# Patient Record
Sex: FEMALE | Race: BLACK OR AFRICAN AMERICAN | URBAN - METROPOLITAN AREA
[De-identification: names, ages, dates, MRNs, and addresses within clinical notes are randomized per-mention and may not be internally consistent; named-entity substitution may affect disease eponyms.]

---

## 2017-04-12 ENCOUNTER — TRANSFERRED RECORDS (OUTPATIENT)
Dept: HEALTH INFORMATION MANAGEMENT | Facility: CLINIC | Age: 47
End: 2017-04-12

## 2017-05-14 ENCOUNTER — HOSPITAL ENCOUNTER (EMERGENCY)
Facility: CLINIC | Age: 47
Discharge: PSYCHIATRIC OR SUBSTANCE USE FACILITY | End: 2017-05-15
Attending: EMERGENCY MEDICINE | Admitting: EMERGENCY MEDICINE

## 2017-05-14 DIAGNOSIS — F20.0 ACUTE EXACERBATION OF CHRONIC PARANOID SCHIZOPHRENIA (H): ICD-10-CM

## 2017-05-14 PROCEDURE — 99285 EMERGENCY DEPT VISIT HI MDM: CPT

## 2017-05-14 ASSESSMENT — ENCOUNTER SYMPTOMS
NERVOUS/ANXIOUS: 1
AGITATION: 1
HALLUCINATIONS: 0

## 2017-05-15 ENCOUNTER — HOSPITAL ENCOUNTER (INPATIENT)
Facility: CLINIC | Age: 47
LOS: 24 days | Discharge: HOME | DRG: 885 | End: 2017-06-08
Attending: PSYCHIATRY & NEUROLOGY | Admitting: PSYCHIATRY & NEUROLOGY

## 2017-05-15 VITALS
DIASTOLIC BLOOD PRESSURE: 77 MMHG | RESPIRATION RATE: 16 BRPM | SYSTOLIC BLOOD PRESSURE: 147 MMHG | TEMPERATURE: 99.3 F | OXYGEN SATURATION: 96 % | HEART RATE: 109 BPM | WEIGHT: 111.99 LBS

## 2017-05-15 DIAGNOSIS — F29 PSYCHOSIS, UNSPECIFIED PSYCHOSIS TYPE (H): Primary | ICD-10-CM

## 2017-05-15 LAB
ALBUMIN SERPL-MCNC: 3.5 G/DL (ref 3.4–5)
ALBUMIN UR-MCNC: NEGATIVE MG/DL
ALP SERPL-CCNC: 84 U/L (ref 40–150)
ALT SERPL W P-5'-P-CCNC: 27 U/L (ref 0–50)
AMORPH CRY #/AREA URNS HPF: ABNORMAL /HPF
AMPHETAMINES UR QL SCN: NORMAL
ANION GAP SERPL CALCULATED.3IONS-SCNC: 8 MMOL/L (ref 3–14)
APPEARANCE UR: ABNORMAL
AST SERPL W P-5'-P-CCNC: 14 U/L (ref 0–45)
BARBITURATES UR QL: NORMAL
BENZODIAZ UR QL: NORMAL
BILIRUB SERPL-MCNC: 0.3 MG/DL (ref 0.2–1.3)
BILIRUB UR QL STRIP: NEGATIVE
BUN SERPL-MCNC: 10 MG/DL (ref 7–30)
CALCIUM SERPL-MCNC: 8.7 MG/DL (ref 8.5–10.1)
CANNABINOIDS UR QL SCN: NORMAL
CHLORIDE SERPL-SCNC: 111 MMOL/L (ref 94–109)
CHOLEST SERPL-MCNC: 207 MG/DL
CO2 SERPL-SCNC: 25 MMOL/L (ref 20–32)
COCAINE UR QL: NORMAL
COLOR UR AUTO: YELLOW
CREAT SERPL-MCNC: 0.65 MG/DL (ref 0.52–1.04)
ERYTHROCYTE [DISTWIDTH] IN BLOOD BY AUTOMATED COUNT: 12.1 % (ref 10–15)
FSH SERPL-ACNC: 77.8 IU/L
GFR SERPL CREATININE-BSD FRML MDRD: ABNORMAL ML/MIN/1.7M2
GLUCOSE SERPL-MCNC: 100 MG/DL (ref 70–99)
GLUCOSE UR STRIP-MCNC: NEGATIVE MG/DL
HCT VFR BLD AUTO: 40.6 % (ref 35–47)
HDLC SERPL-MCNC: 71 MG/DL
HGB BLD-MCNC: 13.5 G/DL (ref 11.7–15.7)
HGB UR QL STRIP: NEGATIVE
KETONES UR STRIP-MCNC: NEGATIVE MG/DL
LDLC SERPL CALC-MCNC: 123 MG/DL
LEUKOCYTE ESTERASE UR QL STRIP: NEGATIVE
MCH RBC QN AUTO: 30.3 PG (ref 26.5–33)
MCHC RBC AUTO-ENTMCNC: 33.3 G/DL (ref 31.5–36.5)
MCV RBC AUTO: 91 FL (ref 78–100)
MUCOUS THREADS #/AREA URNS LPF: PRESENT /LPF
NITRATE UR QL: NEGATIVE
NONHDLC SERPL-MCNC: 136 MG/DL
OPIATES UR QL SCN: NORMAL
PCP UR QL SCN: NORMAL
PH UR STRIP: 7.5 PH (ref 5–7)
PLATELET # BLD AUTO: 196 10E9/L (ref 150–450)
POTASSIUM SERPL-SCNC: 3.9 MMOL/L (ref 3.4–5.3)
PROT SERPL-MCNC: 7.7 G/DL (ref 6.8–8.8)
RBC # BLD AUTO: 4.46 10E12/L (ref 3.8–5.2)
RBC #/AREA URNS AUTO: 0 /HPF (ref 0–2)
SODIUM SERPL-SCNC: 144 MMOL/L (ref 133–144)
SP GR UR STRIP: 1.01 (ref 1–1.03)
TRIGL SERPL-MCNC: 64 MG/DL
TSH SERPL DL<=0.005 MIU/L-ACNC: 1.09 MU/L (ref 0.4–4)
URN SPEC COLLECT METH UR: ABNORMAL
UROBILINOGEN UR STRIP-MCNC: NORMAL MG/DL (ref 0–2)
WBC # BLD AUTO: 8.5 10E9/L (ref 4–11)
WBC #/AREA URNS AUTO: 0 /HPF (ref 0–2)

## 2017-05-15 PROCEDURE — 84443 ASSAY THYROID STIM HORMONE: CPT | Performed by: STUDENT IN AN ORGANIZED HEALTH CARE EDUCATION/TRAINING PROGRAM

## 2017-05-15 PROCEDURE — 85027 COMPLETE CBC AUTOMATED: CPT | Performed by: STUDENT IN AN ORGANIZED HEALTH CARE EDUCATION/TRAINING PROGRAM

## 2017-05-15 PROCEDURE — 36415 COLL VENOUS BLD VENIPUNCTURE: CPT | Performed by: STUDENT IN AN ORGANIZED HEALTH CARE EDUCATION/TRAINING PROGRAM

## 2017-05-15 PROCEDURE — 80053 COMPREHEN METABOLIC PANEL: CPT | Performed by: STUDENT IN AN ORGANIZED HEALTH CARE EDUCATION/TRAINING PROGRAM

## 2017-05-15 PROCEDURE — 99223 1ST HOSP IP/OBS HIGH 75: CPT | Mod: GC | Performed by: PSYCHIATRY & NEUROLOGY

## 2017-05-15 PROCEDURE — 83001 ASSAY OF GONADOTROPIN (FSH): CPT | Performed by: PSYCHIATRY & NEUROLOGY

## 2017-05-15 PROCEDURE — 80342 ANTIPSYCHOTICS NOS 1-3: CPT | Performed by: PSYCHIATRY & NEUROLOGY

## 2017-05-15 PROCEDURE — 36415 COLL VENOUS BLD VENIPUNCTURE: CPT | Performed by: PSYCHIATRY & NEUROLOGY

## 2017-05-15 PROCEDURE — 80061 LIPID PANEL: CPT | Performed by: STUDENT IN AN ORGANIZED HEALTH CARE EDUCATION/TRAINING PROGRAM

## 2017-05-15 PROCEDURE — 12400001 ZZH R&B MH UMMC

## 2017-05-15 PROCEDURE — 80307 DRUG TEST PRSMV CHEM ANLYZR: CPT | Performed by: EMERGENCY MEDICINE

## 2017-05-15 PROCEDURE — 81001 URINALYSIS AUTO W/SCOPE: CPT | Performed by: STUDENT IN AN ORGANIZED HEALTH CARE EDUCATION/TRAINING PROGRAM

## 2017-05-15 RX ORDER — RISPERIDONE 1 MG/1
1 TABLET ORAL AT BEDTIME
Status: DISCONTINUED | OUTPATIENT
Start: 2017-05-15 | End: 2017-05-16

## 2017-05-15 RX ORDER — OLANZAPINE 10 MG/1
10 TABLET ORAL
Status: DISCONTINUED | OUTPATIENT
Start: 2017-05-15 | End: 2017-05-16

## 2017-05-15 RX ORDER — OLANZAPINE 10 MG/2ML
10 INJECTION, POWDER, FOR SOLUTION INTRAMUSCULAR
Status: DISCONTINUED | OUTPATIENT
Start: 2017-05-15 | End: 2017-05-16

## 2017-05-15 RX ORDER — ACETAMINOPHEN 325 MG/1
650 TABLET ORAL EVERY 4 HOURS PRN
Status: DISCONTINUED | OUTPATIENT
Start: 2017-05-15 | End: 2017-06-08 | Stop reason: HOSPADM

## 2017-05-15 ASSESSMENT — ACTIVITIES OF DAILY LIVING (ADL)
FALL_HISTORY_WITHIN_LAST_SIX_MONTHS: NO
DRESS: INDEPENDENT
AMBULATION: 0-->INDEPENDENT
LAUNDRY: WITH SUPERVISION
ORAL_HYGIENE: INDEPENDENT
BATHING: 0-->INDEPENDENT
HYGIENE/GROOMING: INDEPENDENT
DRESS: STREET CLOTHES;INDEPENDENT
DRESS: 0-->INDEPENDENT
GROOMING: HANDWASHING;SHOWER;INDEPENDENT
SWALLOWING: 0-->SWALLOWS FOODS/LIQUIDS WITHOUT DIFFICULTY
TRANSFERRING: 0-->INDEPENDENT
LAUNDRY: WITH SUPERVISION
COGNITION: 0 - NO COGNITION ISSUES REPORTED
TOILETING: 0-->INDEPENDENT
RETIRED_EATING: 0-->INDEPENDENT
ORAL_HYGIENE: INDEPENDENT
RETIRED_COMMUNICATION: 0-->UNDERSTANDS/COMMUNICATES WITHOUT DIFFICULTY

## 2017-05-15 NOTE — H&P
"History and Physical    Hilda Delgadillo MRN# [id number]   Age: 46 year old YOB: 1970   Date of Admission:  05/15/2017          Contacts:   Patient lives in the          Chief Complaint:   \"All my family is against me\"         History of Present Illness:   History obtained from patient interview, chart review.  Pt interviewed on sta 22 at  6 AM     This patient is a 46 year old female with hx of paranoid psychosis, on Risperidone but missing doses, who lives in the  presented on 05/15/2017, BIB family as she became agitated and paranoid while in the airport to board a plane to the [employer].    Per ER Note by Dr. Douglas:  HPI limited by psychosis, history complemented by EMS and family      Hilda Delgadillo is a 46 year old female with a diagnosis of non-specific chronic psychosis in 2015 (while in [place]) who presents via EMS from the airport, accompanied by her son, brother and nephew. She is in town from [place] (on vacation) visiting family. She was at the airport earlier about to fly back home with her son, when she became agitated and paranoid, refused to get on the plane, stating that her family was manipulating her and wanted to deport her. She would only talk to police.   Her son reports that she had stopped taking her risperidone 4 days ago, in the past she used to get it disguised in drinks. She has been evaluated and hospitalized at [facility] in [place].      Upon conversation with the patient, she states that \"the family is out to get me, they were harassing me, trying to deport me, but they don't tell me where to\". She says she does not feel safe going back to [place] because \"there are bad people there who are harassing her son and herself\" and does not wish to go back. Denies visual or auditory hallucinations. No SI. Patient refuses any medications here.    In the ER patient was cooperative with staff, refused to visit with family, was praying on her beads.    /77, , UTox neg, she " "was medically cleared for admission to inpatient psychiatric unit.    Upon interview patient was cooperative. She reported that while at the airport she got very scared to return to  where she doesn't feel safe. She did not cooperate with the procedures in the airport. Her sister and brother told the police that she was \"crazy\" and to please take her to the hospital. Patient thought it was safer in the hospital than at home with family. She lives in [place] with her four children ages 26-18. She and her youngest do not feel safe living there, as they don't treat them well. She was visiting her sister and brother in [place] for a couple of months. She reports that she was taken to [facility] by her family but she was discharged after an overnight stay. She said that her family is against her, they want her to take poisonous drugs. (Per chart review family was giving Rx Risperidone disguised in food) . She thanks God and the  for bringing her to the hospital instead.     Patient denies substance abuse. She denies symptoms of depression, Psychosis and nirav. She says she is a happy and healthy person and enjoys the sun and life like everyone else.     Patient was put on hold in the [facility] ER and medically cleared for admission to Psychiatry. Given patient's history of psychosis, paranoid behavior, non-adherence to medication, risky behaviors in the airport patient is admitted for further evaluation and dispo planning.        Psychiatric History:     Prior diagnoses: Schizophrenia    Hospitalizations: [facility] - In the last month possibly d/vitaliy from the ER, 2004 - In [place]    Suicide attempts: none    Past medications: Rispoeridone           Substance Use History:     None         Social History:     Lives in [facility] [place] the [place]         Family History:   Children with \"behavior problems\"         Past Medical History:   No past medical history on file.  No past surgical history on file.  No " History of seizures or head trauma with loss of consciousness.         Psychiatric Review of Systems:     Denies - Depression, AH,VH, Yesenia, PTSD         Medical Review of Systems:   The Review of Systems is negative other than noted in the HPI         Medications:     Current Outpatient Prescriptions   Medication Sig Dispense Refill     RisperiDONE Microspheres (RISPERDAL CONSTA IM)                Allergies:    No Known Allergies           Labs:     Recent Results (from the past 24 hour(s))   Drug abuse screen urine    Collection Time: 05/15/17  2:19 AM   Result Value Ref Range    Amphetamine Qual Urine  NEG     Negative   Cutoff for a negative amphetamine is 500 ng/mL or less.      Barbiturates Qual Urine  NEG     Negative   Cutoff for a negative barbiturate is 200 ng/mL or less.      Benzodiazepine Qual Urine  NEG     Negative   Cutoff for a negative benzodiazepine is 200 ng/mL or less.      Cannabinoids Qual Urine  NEG     Negative   Cutoff for a negative cannabinoid is 50 ng/mL or less.      Cocaine Qual Urine  NEG     Negative   Cutoff for a negative cocaine is 300 ng/mL or less.      Opiates Qualitative Urine  NEG     Negative   Cutoff for a negative opiate is 300 ng/mL or less.      PCP Qual Urine  NEG     Negative   Cutoff for a negative PCP is 25 ng/mL or less.              Psychiatric Examination:     There were no vitals taken for this visit.    Appearance:  awake, alert and adequately groomed  Attitude:  cooperative  Eye Contact:  fair  Mood:  anxious  Affect:  mood congruent and intensity is normal  Speech:  clear, coherent and pressured speech  Psychomotor Behavior:  no evidence of tardive dyskinesia, dystonia, or tics  Thought Process:  goal oriented and circumstantial  Associations:  no loose associations  Thought Content:  no evidence of suicidal ideation or homicidal ideation, no auditory hallucinations present and no visual hallucinations present  Insight:  limited  Judgment:  limited  Oriented  to:  time, person, and place  Attention Span and Concentration:  fair  Recent and Remote Memory:  fair  Language:adequate spoken english  Fund of Knowledge: low-normal  Muscle Strength and Tone: normal  Gait and Station: Normal         Physical Exam:     See ED assessment note by Dr. Douglas on 05/15/2017          Assessment   Hilda Delgadillo is a 46 year old female with a history of paranoid schizophrenia who was BIB family as patient got agitated and paranoid while in the airport in [facility] about to board a plane to [place]. Patient does not use substances and Fam Hx is not s/f mental illness. MSE notable for a agitated woman with poor insight and paranoia. Given that she is psychotic, off of meds patient warrants inpatient psychiatric hospitalization to maintain her safety. Disposition pending clinical stabilization, medication optimization and development of an appropriate discharge plan.          Plan     # Psychosis ([facility]) - Likely Paranoid Schizophrenia per hx    - Did not start PTA  Risperidone, patient has not been compliant and declines medications. Per note patient was started on Consta. Primary team to get collateral from family.      Laboratory/Imaging: Pending CBC, CMP, TSH, Lipids  Consults:None  Legal Status:72 h hold Started 12:34 AM 05/15/17  Precautions: suicide, self harm  Dispo: unknown pending medication management and clinical stabilization    -------------------------------------  The patient was evaluated by the  Attending physician who agrees with the above plan.    Juve Sol MD  Psychiatry Resident PGY-2  Attending Admission Attestation Note:    I personally interviewed and examined Hilda on May 15, 2017, I reviewed the admission history/examination and other documents related to the admission.  I confirmed the findings in the admission note and I agree with the diagnosis and treatment plan with the following corrections, clarifications, additional findings, and assessments: I certify  that the treatment plan was reviewed and approved or developed by me in accordance with standard psychiatric practice. I certifiy that the inpatient services were ordered in accordance with the Medicare regulations governing the order. This includes certification that hospital inpatient services are reasonable and necessary and in the case of services not specified as inpatient-only under 42 .22(n), that they are appropriately provided as inpatient services in accordance with the 2-midnight benchmark under 42 .3(e).     The reason for inpatient status is Acute Psychosis, Danger to others due to mental illness and Unable to care for self due to mental illness. High degree of complexity due to lack of previous documentation, uncertain diagnosis, English as a 2nd language, lack of insight.      Eyal Hand M.D.  of Psychiatry  Pager: [phone]    email: [email or web address]
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
## 2017-05-15 NOTE — PROGRESS NOTES
Initial Psychosocial Assessment    I have reviewed the chart, met with the patient, and developed Care Plan.  Information for assessment was obtained from:     Patient: Interviewed Patient is a poor historian and not always agreeable with questioning.    Presenting Problem:  Hilda Delgadillo is a 46 year old female with a diagnosis of non-specific chronic psychosis in 2015 (while in [place]) who presents via EMS from the airport, accompanied by her son, brother and nephew. She is in town from [place] (on vacation) visiting family. She was at the airport earlier about to fly back home with her son, when she became agitated and paranoid, refused to get on the plane, stating that her family was manipulating her and wanted to deport her. She would only talk to police.   Her son reports that she had stopped taking her risperidone 4 days ago, in the past she used to get it disguised in drinks. She has been evaluated and hospitalized at [facility] in [place].     History of Mental Health and Chemical Dependency:  Several previous hospitalizations in [place], [place] and in [facility]  Has previously been on medications but currently is refusing     Family Description (Const[facility], Family Psychiatric History):  Has 4 adult children 3 living in [place] and 1 in [place], [place]     Significant Life Events (Illness, Abuse, Trauma, Death):  Unable to assess     Living Situation:  Lives  In [place]     Educational Background:  Unknown     Occupational History:   in [place]     Financial Status:  Supports self     Legal Issues:  72 hour hold    Ethnic/Cultural Issues:  Grenadian    Spiritual Orientation:  Worship     Service History:  None    Social Functioning (organization, interests):  Unable to assess     Current Treatment Providers are:  None     Social Service Assessment/Plan:  Patient has been admitted for psychiatric stabilization for this acute crisis. Patient will meet with treatment team including a  psychiatrist to have psychiatric assessment and medication management. Team will coordinate with the any outpatient medication providers to review and adjust medications as appropriate. Staff will provide therapeutic programming and structure to help maintain a safe environment for healing. Staff will continue to assess patient as needed. Patient will participate in unit groups and activities. Patient will receive individual and group support on the unit. CTC will coordinate with outpatient providers and will place referrals to ensure appropriate follow up care is in place. A petition for commitment will be initiated as patient is not agreeable with treatment.

## 2017-05-15 NOTE — ED NOTES
Patient has prayer beads on her person, she has been utilizing them frequently for prayer, seems to have calming/coping effect for her.

## 2017-05-15 NOTE — ED PROVIDER NOTES
"  History     Chief Complaint:  Psychiatric evaluation     HPI   HPI limited by psychosis, history complemented by EMS and family     Hilda Delgadillo is a 46 year old female with a diagnosis of non-specific chronic psychosis in 2015 (while in [place]) who presents via EMS from the airport, accompanied by her son, brother and nephew. She is in town from [place] (on vacation) visiting family. She was at the airport earlier about to fly back home with her son, when she became agitated and paranoid, refused to get on the plane, stating that her family was manipulating her and wanted to deport her. She would only talk to police.   Her son reports that she had stopped taking her risperidone 4 days ago, in the past she used to get it disguised in drinks. She has been evaluated and hospitalized at [facility] in [place].     Upon conversation with the patient, she states that \"the family is out to get me, they were harassing me, trying to deport me, but they don't tell me where to\". She says she does not feel safe going back to [place] because \"there are bad people there who are harassing her son and herself\" and does not wish to go back. Denies visual or auditory hallucinations. No SI.   Patient refuses any medications here.    Allergies:  No Known Allergies     Medications:    Risperidone     Past Medical History:    Schizophrenia     Past Surgical History:    History reviewed. No pertinent surgical history.    Family History:    History is non-contributory.     Social History:  Smoking status: negative   Alcohol status: negative   Patient presents via EMS, family is here.  Patient lives in [facility].     Review of Systems   Psychiatric/Behavioral: Positive for agitation. Negative for hallucinations, self-injury and suicidal ideas. The patient is nervous/anxious.         Positive for paranoia.   All other systems reviewed and are negative.      Physical Exam   First Vitals:  BP: 147/77  Heart Rate: 112  Temp: 99.3  F (37.4 "  C)  Resp: 16  Weight: 50.8 kg (111 lb 15.9 oz)  SpO2: 100 %      Physical Exam  Patient has a flat affect but very cooperative  HENT: grossly normal, mucous membranes appear moist.  LUNGS: clear to auscultation.  CV: normal with no murmur. Strong radial pulses.  ABDOMEN: soft  SKIN: not diaphoretic, no obvious rash  NEUROLOGIC: awake, alert, moves all extremities equally. She is able to ambulate without difficulty. CN II-XII intact.   PSYCH: patient has a flat affect, she is a little anxious with some hand ringing noted, however she makes good eye contact. She does seem paranoid. States that her family is out to get her and wants to deport her. No obvious hallucinations however. She denies SI.    Emergency Department Course     Laboratory:  Laboratory findings were communicated with the patient and her family,  who voiced understanding of the findings.    0219: Drug abuse screen: negative, o/w Negative    Emergency Department Course:  Nursing notes and vitals reviewed.  I performed an exam of the patient as documented above.     1141, discussed patient with Liza at [facility] regarding bed for patient  0308, discussed case with Dr. Sol, accepting physician, [address]     Patient will be transferred to [facility]. This was discussed with her and the family. Patient is on a 72 hour hold.    Impression & Plan      Medical Decision Making:  Patient certainly has paranoia. She has been off her medications for 4 days and with her paranoid schizophrenia diagnosis I believe it is out of ctrl and she needs admission. we were able to obtain a bed for her at [facility] station 22 with Dr. Sol. Urine tox screen was negative. She has been very cooperative, she is medically stable.     Diagnosis:    ICD-10-CM    1. Acute exacerbation of chronic paranoid schizophrenia (H) F20.0 Drug abuse screen urine     Disposition:   Transfer by ambulance to [facility]. Placed on 72 hour hold.   Sol will take over care.    Scribe Disclosure:  I, Kiana Sin, am serving as a scribe at 11:41 PM on 5/14/2017 to document services personally performed by Olena Clifton MD, based on my observations and the provider's statements to me.    EMERGENCY DEPARTMENT       Olena Clifton MD  05/15/17 0359

## 2017-05-15 NOTE — ED NOTES
Pt asked about her belongings and I informed her that they were in the closet. She again asked if she could have them for 5 min. I reassured her that they were in the closet but i couldn't let her have them at this time.

## 2017-05-15 NOTE — PROGRESS NOTES
Pleasant and polite. Declined groups. Isolative to room much of shift. Ate meals. Well groomed.       05/15/17 1500   Behavioral Health   Hallucinations denies / not responding to hallucinations   Thinking distractable;poor concentration   Orientation person: oriented;place: oriented;date: oriented;time: oriented   Memory baseline memory   Insight poor   Judgement impaired   Eye Contact at examiner   Affect full range affect   Mood mood is calm   Physical Appearance/Attire attire appropriate to age and situation   Hygiene well groomed   Speech clear;coherent   Psychomotor / Gait balanced;steady   Psycho Education   Type of Intervention structured groups   Response refuses   Activities of Daily Living   Hygiene/Grooming independent   Oral Hygiene independent   Dress independent   Laundry with supervision   Room Organization independent
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
## 2017-05-15 NOTE — IP AVS SNAPSHOT
[address] [address]    Phone:  [phone]                                       After Visit Summary   5/15/2017    Hilda Delgadillo    MRN: [id number]           After Visit Summary Signature Page     I have received my discharge instructions, and my questions have been answered. I have discussed any challenges I see with this plan with the nurse or doctor.    ..........................................................................................................................................  Patient/Patient Representative Signature      ..........................................................................................................................................  Patient Representative Print Name and Relationship to Patient    ..................................................               ................................................  Date                                            Time    ..........................................................................................................................................  Reviewed by Signature/Title    ...................................................              ..............................................  Date                                                            Time

## 2017-05-15 NOTE — PROGRESS NOTES
05/15/17 0723   Patient Belongings   Did you bring any home meds/supplements to the hospital?  Yes   Disposition of meds  Sent to security/pharmacy per site process   Patient Belongings body jewelry;bracelet;cell phone/electronics;clothing;earings;glasses;jewelry;keys;money (see comment);necklace;ring;wallet;other (see comments)   Disposition of Belongings Sent to security: Sealed evidence/belongings bag, passport black cloth bag with 4 gold coloerd bracelettes, 3 pairs of gold colored earings, 3 gold colored necklaces, 2 gold colored pendants, 66 us dollars, 10 EU, a handfull of unidentifiable change, wallet, 3 credit cards and a drivers license .  Put in Pt locker: 10 bottles of toiletry supplies, 3 bottles of perfume, yellow skirt, pair of socks, floral pants, black scarf, black underwear, purse, keys, glasses case, ID,    Belongings Search Yes   Clothing Search Yes   Second Staff Kary Friedman       5/15/17 Original envelope sent up to unit to confirm passport ID. Returned items in env [id number].    5/17/17 Addendum- family brought in additional belongings- two dresses, two pair black pants, prayer mat, three scarves, long sleeved dress, long skirt, flag, Vaseline, , toothbrush, bottle of honey   ADMISSION:  I am responsible for any personal items that are not sent to the safe or pharmacy. [facility] is not responsible for loss, theft or damage of any property in my possession.    Patient Signature _____________________ Date/Time _____________________    Staff Signature _______________________ Date/Time _____________________    2nd Staff person, if patient is unable/unwilling to sign  ___________________________________ Date/Time _____________________    DISCHARGE:  My personal items have been returned to me.   Patient Signature _____________________ Date/Time _____________________

## 2017-05-15 NOTE — IP AVS SNAPSHOT
MRN:[id number]                      After Visit Summary   5/15/2017    Hilda Delgadillo    MRN: [id number]           Thank you!     Thank you for choosing [facility] for your care. Our goal is always to provide you with excellent care.        Patient Information     Date Of Birth          1970        Designated Caregiver       Most Recent Value    Caregiver    Will someone help with your care after discharge? no      About your hospital stay     You were admitted on:  May 15, 2017 You last received care in the:  [facility]    You were discharged on:  June 8, 2017       Who to Call     For medical emergencies, please call 911.  For non-urgent questions about your medical care, please call your primary care provider or clinic, None          Attending Provider     Provider Specialty    Eyal Hand MD Psychiatry       Primary Care Provider    None      Further instructions from your care team       Behavioral Discharge Planning and Instructions      Summary:  You were admitted on 5/15/2017 for Delusional Thoughts.  You were treated by Dr. Eyal Hand MD and discharged on 6/8/17 from [facility]. While in the hospital you have been placed a civil commitment through [facility] and it will be in place until December 2017.    Main Diagnosis: Schizoaffective Disorder     Health Care Follow-up Appointments:     Because your MA is pending you will need to go to a walk-in appointment for medication at Thursday at 9am:  [facility]- Thursday June 15th 9am   [address]  [address] and [address]rd [place] [address]  Phone: [phone]  FAX: [phone] Attention: [facility] Unit Coordinator will fax Facesheet, MAR, H&P and Discharge Summary to above contact in order to complete follow up appointment.     Attend all scheduled appointments with your outpatient providers. Call at least 24 hours in advance if you need to reschedule an appointment to ensure continued access to  your outpatient providers.   Major Treatments, Procedures and Findings:  You were provided with: a psychiatric assessment, medication evaluation and/or management and milieu management    Symptoms to Report: feeling more aggressive, increased confusion or mood getting worse    Early warning signs can include: increased depression or anxiety sleep disturbances increased unusual thinking, such as paranoia or hearing voices    Safety and Wellness:  Take all medicines as directed.  Make no changes unless your doctor suggests them.      Follow treatment recommendations.  Refrain from alcohol and non-prescribed drugs.  If there is a concern for safety, call 911.    Resources:   [facility]  [facility]  Walk-in Hours at:  [address], [address]  [place] [address]    Every Thursday from 1pm-4pm.    Please note that if you come for walk-in hours:  - You must arrive by 12:30 pm in order to be see. At 12:30, you will draw a number to determine the order in which you will be seen.  - We may need to schedule an additional time to meet with you in order to answer your question    [facility] [facility]  c/o LUCIANO Murray  [address], [address]  [place] [address]  [facility]  TELEPHONE(+1) [phone]    Crisis Intervention: 374.764.4512 or 842-775-4041 (TTY: 371.319.5800).  Call anytime for help.  National [place] on Mental Illness (www.[place].lynda.org): 916.737.7468 or 617-151-6747.  Suicide Awareness Voices of Education (SAVE) (www.save.org): 861-415-FXYY (4983)  National Suicide Prevention Line (www.mentalhealthmn.org): 666-785-RUTV (6967)  Mental Health Consumer/Survivor Network of [place] (www.mhcsn.net): 602.709.1960 or 048-965-7033  Mental Health Association of [place] (www.mentalhealth.org): 519.435.7632 or 710-163-8208  Self- Management and Recovery Training., SMART-- Toll free: 280.104.2664  www.[employer]  Crisis text  "line: Text \"START\" to 537-562. Free, confidential, .  Crisis Intervention: 753.178.1010 or 390-875-2727. Call anytime for help.     The treatment team has appreciated the opportunity to work with you.     If you have any questions or concerns our unit number is [phone]  You may be receiving a follow-up phone call within the next three days from a representative from behavioral health.              Pending Results     No orders found from 2017 to 2017.            Admission Information     Date & Time Provider Department Dept. Phone    5/15/2017 Eyal Hand MD [facility] [phone]      Your Vitals Were     Blood Pressure Pulse Temperature Respirations Height Weight    111/62 79 98.8  F (37.1  C) 12 1.702 m (5' 7\") 78.9 kg (174 lb)    BMI (Body Mass Index)                   27.25 kg/m2           [employer] Information     [employer] lets you send messages to your doctor, view your test results, renew your prescriptions, schedule appointments and more. To sign up, go to [email or web address] . Click on \"Log in\" on the left side of the screen, which will take you to the Welcome page. Then click on \"Sign up Now\" on the right side of the page.     You will be asked to enter the access code listed below, as well as some personal information. Please follow the directions to create your username and password.     Your access code is: [email or web address]  Expires: 8/15/2017 11:08 AM     Your access code will  in 90 days. If you need help or a new code, please call your [facility] clinic or [phone].        Care EveryWhere ID     This is your Care EveryWhere ID. This could be used by other organizations to access your [facility] medical records  [id number]           Review of your medicines      START taking        Dose / Directions    haloperidol decanoate 50 MG/ML Soln injection   Commonly known as:  HALDOL DECANOATE        Dose:  75 mg   Start taking on:  2017   Inject 75 mg into the muscle every " 30 days for 1 dose   Quantity:  1.5 mL   Refills:  0         STOP taking     RISPERDAL CONSTA IM           RISPERDAL PO                Where to get your medicines      Some of these will need a paper prescription and others can be bought over the counter. Ask your nurse if you have questions.     You don't need a prescription for these medications     haloperidol decanoate 50 MG/ML Soln injection                Protect others around you: Learn how to safely use, store and throw away your medicines at www.disposemymeds.org.             Medication List: This is a list of all your medications and when to take them. Check marks below indicate your daily home schedule. Keep this list as a reference.      Medications           Morning Afternoon Evening Bedtime As Needed    haloperidol decanoate 50 MG/ML Soln injection   Commonly known as:  HALDOL DECANOATE   Inject 75 mg into the muscle every 30 days for 1 dose   Start taking on:  7/5/2017   Last time this was given:  100 mg on 6/5/2017 10:15 PM                                          More Information        Patient Education    Haloperidol Decanoate Suspension for injection    Haloperidol Lactate Oral solution    Haloperidol Lactate Solution for injection    Haloperidol Oral tablet  Haloperidol Decanoate Suspension for injection  What is this medicine?  HALOPERIDOL (ha gregg PER i dole) helps to treat schizophrenia. It can help you to keep in touch with reality and reduce your mental problems.  This medicine may be used for other purposes; ask your health care provider or pharmacist if you have questions.  What should I tell my health care provider before I take this medicine?  They need to know if you have any of these conditions:    dementia    head injury    heart disease    irregular heartbeat    low or high levels of electrolytes in the blood    lung disease    Parkinson's disease    thyroid disease    an unusual or allergic reaction to haloperidol, tartrazine, other  medicines, foods, dyes, or preservatives    pregnant or trying to get pregnant    breast-feeding  How should I use this medicine?  This medicine is for injection into a muscle. It is given by a health care professional in a hospital or clinic setting.  Talk to your pediatrician regarding the use of this medicine in children. Special care may be needed.  Overdosage: If you think you have taken too much of this medicine contact a poison control center or emergency room at once.  NOTE: This medicine is only for you. Do not share this medicine with others.  What if I miss a dose?  It is important not to miss your dose. Call your doctor or health care professional if you are unable to keep an appointment.  What may interact with this medicine?  Do not take this medicine with any of the following medications:    arsenic trioxide    certain antibiotics like grepafloxacin, pentamidine, sparfloxacin    certain medicines for fungal infections like fluconazole, itraconazole, ketoconazole, posaconazole, voriconazole    certain medicines for irregular heart beat like dofetilide, dronedarone    certain medicines for malaria like chloroquine, halofantrine    cisapride    droperidol    levomethadyl    methadone    pimozide    ranolazine    risperidone    thioridazine    ziprasidone  This medicine may also interact with the following medications:    alcohol    atropine    benztropine    cabergoline    carbamazepine    certain medicines for depression, anxiety, or psychotic disturbances    certain medicines for Parkinson's disease like levodopa    certain medicines that treat or prevent blood clots like warfarin    dicyclomine    lithium    narcotic medicines for pain    other medicines that prolong the QT interval (cause an abnormal heart rhythm)    promethazine    rifampin  This list may not describe all possible interactions. Give your health care provider a list of all the medicines, herbs, non-prescription drugs, or dietary  supplements you use. Also tell them if you smoke, drink alcohol, or use illegal drugs. Some items may interact with your medicine.  What should I watch for while using this medicine?  Visit your doctor or health care professional for regular checks on your progress. It may be a few weeks before you see the full effects of this medicine.  You may get dizzy or drowsy or have blurred vision. Do not drive, use machinery, or do anything that needs mental alertness until you know how this medicine affects you. Do not stand or sit up quickly, especially if you are an older patient. This reduces the risk of dizzy or fainting spells. Alcohol can increase dizziness and drowsiness. Avoid alcoholic drinks.  Do not treat yourself for colds, diarrhea or allergies. Ask your doctor or health care professional for advice, some nonprescription medicines may increase possible side effects.  Your mouth may get dry. Chewing sugarless gum or sucking hard candy, and drinking plenty of water may help. Contact your doctor if the problem does not go away or is severe.  This medicine can reduce the response of your body to heat or cold. Dress warm in cold weather and stay hydrated in hot weather. If possible, avoid extreme temperatures like saunas, hot tubs, very hot or cold showers, or activities that can cause dehydration such as vigorous exercise.  This medicine can make you more sensitive to the sun. Keep out of the sun. If you cannot avoid being in the sun, wear protective clothing and use sunscreen. Do not use sun lamps or tanning beds/booths.  What side effects may I notice from receiving this medicine?  Side effects that you should report to your doctor or health care professional as soon as possible:    allergic reactions like skin rash, itching or hives, swelling of the face, lips, or tongue    breast pain or swelling or unusual production of breast milk    breathing problems    chest pain    confusion    fast, irregular, pounding  heartbeat    feeling faint or lightheaded, falls    fever, chills, or sore throat    hot, dry skin or lack of sweating    problems with balance, talking, walking    seizures    stiffness, spasms, trembling    trouble passing urine or change in the amount of urine    uncontrollable head, mouth, neck, arm, or leg movements    unusually weak or tired  Side effects that usually do not require medical attention (report to your doctor or health care professional if they continue or are bothersome):    anxiety or agitation    change in sex drive or performance    constipation or diarrhea    menstrual changes    nausea or vomiting    weight gain  This list may not describe all possible side effects. Call your doctor for medical advice about side effects. You may report side effects to FDA at 2-288-FDA-7240.  Where should I keep my medicine?  This drug is given in a hospital or clinic and will not be stored at home.  NOTE:This sheet is a summary. It may not cover all possible information. If you have questions about this medicine, talk to your doctor, pharmacist, or health care provider. Copyright  2016 Gold Standard

## 2017-05-15 NOTE — ED NOTES
Bed: ED16  Expected date: 5/14/17  Expected time: 10:40 PM  Means of arrival: Ambulance  Comments:  [place] [id number]  cosme;on hold

## 2017-05-16 LAB
9OH-RISPERIDONE SERPL-MCNC: 2.4 NG/ML
RISPERIDONE SERPL-MCNC: ABNORMAL NG/ML
RISPERIDONE+9OH-RIS SERPL-MCNC: 2.4 NG/ML

## 2017-05-16 PROCEDURE — 25000132 ZZH RX MED GY IP 250 OP 250 PS 637: Performed by: STUDENT IN AN ORGANIZED HEALTH CARE EDUCATION/TRAINING PROGRAM

## 2017-05-16 PROCEDURE — 99232 SBSQ HOSP IP/OBS MODERATE 35: CPT | Mod: GC | Performed by: PSYCHIATRY & NEUROLOGY

## 2017-05-16 PROCEDURE — 12400001 ZZH R&B MH UMMC

## 2017-05-16 RX ORDER — BENZTROPINE MESYLATE 1 MG/ML
1 INJECTION, SOLUTION INTRAMUSCULAR; INTRAVENOUS DAILY PRN
Status: DISCONTINUED | OUTPATIENT
Start: 2017-05-16 | End: 2017-05-26

## 2017-05-16 RX ORDER — HALOPERIDOL 2 MG/1
2 TABLET ORAL AT BEDTIME
Status: DISCONTINUED | OUTPATIENT
Start: 2017-05-16 | End: 2017-05-16

## 2017-05-16 RX ORDER — HALOPERIDOL 5 MG/ML
5 INJECTION INTRAMUSCULAR
Status: DISCONTINUED | OUTPATIENT
Start: 2017-05-16 | End: 2017-06-08 | Stop reason: HOSPADM

## 2017-05-16 RX ORDER — HALOPERIDOL 2 MG/1
2 TABLET ORAL
Status: DISCONTINUED | OUTPATIENT
Start: 2017-05-16 | End: 2017-05-16

## 2017-05-16 RX ORDER — BENZTROPINE MESYLATE 1 MG/1
1 TABLET ORAL 2 TIMES DAILY PRN
Status: DISCONTINUED | OUTPATIENT
Start: 2017-05-16 | End: 2017-05-26

## 2017-05-16 RX ORDER — HALOPERIDOL 5 MG/ML
2 INJECTION INTRAMUSCULAR
Status: DISCONTINUED | OUTPATIENT
Start: 2017-05-16 | End: 2017-05-16

## 2017-05-16 RX ORDER — HALOPERIDOL 2 MG/1
2 TABLET ORAL
Status: DISCONTINUED | OUTPATIENT
Start: 2017-05-16 | End: 2017-06-08 | Stop reason: HOSPADM

## 2017-05-16 ASSESSMENT — ACTIVITIES OF DAILY LIVING (ADL)
GROOMING: INDEPENDENT
LAUNDRY: WITH SUPERVISION
DRESS: STREET CLOTHES;INDEPENDENT
ORAL_HYGIENE: INDEPENDENT

## 2017-05-16 NOTE — PROGRESS NOTES
Pt present in milieu, ate meals, did not attend groups. Pt appears pleasant and calm yet anxious. Pt denies depressive sx/loya/SI/SIB. services utilized during check-in.     05/16/17 1200   Behavioral Health   Hallucinations denies / not responding to hallucinations   Thinking delusional   Orientation person: oriented;date: oriented   Memory baseline memory   Insight denial of illness   Judgement impaired   Eye Contact at examiner   Affect full range affect   Mood anxious;mood is calm   Physical Appearance/Attire attire appropriate to age and situation   Hygiene well groomed   Suicidality other (see comments)  (denies)   Self Injury other (see comment)  (denies)   Activity withdrawn   Speech clear   Medication Sensitivity no stated side effects;no observed side effects   Psychomotor / Gait balanced;steady   Psycho Education   Type of Intervention 1:1 intervention   Response participates, initiates socially appropriate   Hours 0.5   Treatment Detail check-in   Activities of Daily Living   Hygiene/Grooming independent   Oral Hygiene independent   Dress street clothes;independent   Laundry with supervision   Room Organization independent

## 2017-05-16 NOTE — PLAN OF CARE
Problem: General Plan of Care (Inpatient Behavioral)  Goal: Team Discussion  Team Plan:   BEHAVIORAL TEAM DISCUSSION     Continued Stay Criteria/Rationale: New admission due to agitation and disorganization   Plan: Continue to monitor patient's symptoms as a medication regiment is initiated and document changes until proper discharge is set in place.   Participants: Mercedes Romero [facility], Latricia Vargas RN, Dr. Marlena Rodriguez MD,  Dr. Eyal Hand MD, Dr. Yvan Werner MD, Hugo Batista MS4,   Summary/Recommendation: Continue to encourage medication as patient refuses any medications. Discuss options with family as patient is refusing to return [facility] to [place] at [facility]'s encouragement.   Progress: Initiated

## 2017-05-16 NOTE — PROGRESS NOTES
SPIRITUAL HEALTH SERVICES  SPIRITUAL ASSESSMENT Progress Note  [facility] ([facility]    REFERRAL SOURCE: I visited Hilda this morning per Epic consult. Pt requested a Episcopalian  visit and I informed our staff Episcopalian  to visit the pt. Pt also requested to get a Quran and I gave it to her and she was so happy to have it.    PLAN: Today our Episcopalian Imcallie is out off the office but tomorrow he will stop by to visit the pt.       Christine Wallace M.Div. ([place]lem), M.Th., D.Min., [facility]  Staff   Pager [phone]

## 2017-05-16 NOTE — PLAN OF CARE
"Problem: Psychotic Symptoms  Goal: Psychotic Symptoms  Signs and symptoms of listed problems will be absent or manageable.   Outcome: No Change  Hilda left community meeting shortly after it began. She ate almost all of her dinner. She agreed to be interviewed for adult profile. Most of her answers were denying any symptoms. However, asked who her main emotional support is, she replied, \"Yvette.\" When this nurse asked her what she meant, she said that \"I contacted him when I got here. He bring me a letter.\" She again referred to Yvette when asked what we as staff can do to help her to receive more personalized care? She said that we could get Yvette to visit her here. I reminded her that he is probably pretty busy. She is quite angry at her family for bringing her to the hospital, first in [place], then here. She insists that she does not want or need any medication.      "
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
## 2017-05-17 ENCOUNTER — OFFICE VISIT (OUTPATIENT)
Dept: INTERPRETER SERVICES | Facility: CLINIC | Age: 47
End: 2017-05-17

## 2017-05-17 PROCEDURE — 97150 GROUP THERAPEUTIC PROCEDURES: CPT | Mod: GO

## 2017-05-17 PROCEDURE — 99232 SBSQ HOSP IP/OBS MODERATE 35: CPT | Mod: GC | Performed by: PSYCHIATRY & NEUROLOGY

## 2017-05-17 PROCEDURE — 90853 GROUP PSYCHOTHERAPY: CPT

## 2017-05-17 PROCEDURE — 25000132 ZZH RX MED GY IP 250 OP 250 PS 637: Performed by: STUDENT IN AN ORGANIZED HEALTH CARE EDUCATION/TRAINING PROGRAM

## 2017-05-17 PROCEDURE — T1013 SIGN LANG/ORAL INTERPRETER: HCPCS | Mod: U3

## 2017-05-17 PROCEDURE — 12400001 ZZH R&B MH UMMC

## 2017-05-17 ASSESSMENT — ACTIVITIES OF DAILY LIVING (ADL)
ORAL_HYGIENE: INDEPENDENT
DRESS: SCRUBS (BEHAVIORAL HEALTH)
LAUNDRY: WITH SUPERVISION
HYGIENE/GROOMING: INDEPENDENT

## 2017-05-17 NOTE — PROGRESS NOTES
Hilda's family left before dinner. It seemed obvious that they were arguing by their voices and by gestures. She did not attend community meeting. Had poor boundaries with female peer, S.Y., reaching out to touch her arms in the hallway. She was told to please not touch her peers. At bedtime, this nurse approached her with bedtime haldol, explaining that it would help to clear her thoughts. She made a dismissive hand gesture and said that she does not need this. There is nothing wrong with her at all. She blames her family's disagreement upon her grown sons, etc. Says that they do not listen to her.

## 2017-05-17 NOTE — PROGRESS NOTES
05/17/17 1000   Behavioral Health   Hallucinations denies / not responding to hallucinations   Thinking poor concentration;distractable   Orientation person: oriented;place: oriented   Memory baseline memory   Insight poor;denial of illness   Judgement impaired   Affect tense;irritable   Mood anxious;irritable   Physical Appearance/Attire attire appropriate to age and situation   Hygiene well groomed   Activity hyperactive (agitated, impulsive);other (see comment)  (Intrusive)   Speech coherent   Psychomotor / Gait balanced;steady   Overt Agression (WDL) Ex   Overt Aggression Scale   Verbal Aggression 0   Aggression against Property 0   Auto-Aggression 0   Physical Aggression 7   Overt Aggression Total Score 7   Psycho Education   Type of Intervention structured groups   Response refuses   Activities of Daily Living   Hygiene/Grooming independent   Oral Hygiene independent   Dress scrubs (behavioral health)   Laundry with supervision   Room Organization independent

## 2017-05-17 NOTE — PROGRESS NOTES
"Observed to have slept for only 4.5 hrs tonight, broken sleep w/inability to remain asleep. Appears w/ overwhelming perplexity at times.  Becomes loud at times;  When requested to lower voice for instance, responds w/raised voiced, why, why  lower my voice.  Displays poor boundaries;  Became argumentative w/ male peer (BJ) and denied having said \"I'm going to kill you\" as reported by BJ.  Required quiet time and verbalized understanding when requested to maintain appropriate distance from BJ.  Apologizes and cooperates w/i her limits.  Retentive capabilities questionable.  Verbalizes no understanding as to why she's in the hospital.  Realistic supportive intervention provided as appropriate.  Prn's encouraged - declined x's 2 - \"I don't need medication.  No I don't need medication\".  Paces at times, spends time in lounge, praying at times in room.  Spending long periods of time in bathroom.  Needs anticipated and attended proactively.  Realistic, supportive encouragement/education provided as appropriate.  Continuing to monitor.  "

## 2017-05-17 NOTE — PROGRESS NOTES
05/17/17 1500   Visit Information   Visit Made By Staff    Type of Visit Initial   Visited Patient   Interventions   Basic Spiritual Interventions    introduction/orientation to Spiritual Health Services;Assessment of spiritual needs/resources;Reflective conversation;Life Review;Prayer   Provision of Spiritual Resources  Prayer rug   Advanced Assessments/Interventions   Presenting Concerns/Issues Spiritual/Taoism/emotional support   SPIRITUAL HEALTH SERVICES Progress Note  [facility] ( [facility] [id number]          DATA:    Visited with pt on the basis of referral from unit  for spiritual support of the pt.  Reflected with pt around her hospital experience, sources of spiritual and emotional support and current spiritual health needs. Pt talked about her current situation and what it means for her. Pt shared her story and her family. During my conversation with her I let her know that I could be support of her.  Pt reported that her brother brought to the hospital. She has a lot of disconnected thoughts.        INTERVENTION:    Emotional support. Reflective conversation integrating illness elements and family spiritual narratives. I encouraged her to follow the doctor s advised. Also, I encouraged her to reconnect to her family. I introduced spiritual Health Services that the hospital offers. I also, introduced myself as Latter-day  in the hospital. I gave copy of the Quran.        OUTCOME:    Pt received spiritual support and reflective conversation in the context of this hospitalization. The pt expressed appreciation for the visit and the encouragement that she felt.        PLAN:    Will continue to provide support to pt/family during their hospitalization at least 1x/wk.                                                                                                                                             Carlos Hernandez  Staff   Pager [phone]
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
## 2017-05-17 NOTE — PROGRESS NOTES
RE - Petition for commitment [facility]     - pre petition screening team supports petition request - court hold to be called to unit when completed per [facility] process      Voicemail received on Green team CTC voicemail from Dr. Butler with [facility] PPS , per Dr. Butler they support petition for commitment if any questions for her call [phone]

## 2017-05-17 NOTE — PROGRESS NOTES
0710 Pt was brushing teeth in dining room sink, redirected by night staff. Pt ignored repeated redirection. This writer was asked to help and redirect. Pt had also grabbed newspaper that others were wanting to look at, refuse redirection to set it down. Instead pt took it to her room, ripped holes into sections of the paper.     0840 Pt attempted to push past staff to gain entry to locker in exam room. Pt refused direction to back out of exam room and required mild physical redirection.    Calmer and more appropriate after noon. Active particip in pm OT craft group.
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
## 2017-05-18 ENCOUNTER — OFFICE VISIT (OUTPATIENT)
Dept: INTERPRETER SERVICES | Facility: CLINIC | Age: 47
End: 2017-05-18

## 2017-05-18 PROCEDURE — 12400001 ZZH R&B MH UMMC

## 2017-05-18 PROCEDURE — 90853 GROUP PSYCHOTHERAPY: CPT

## 2017-05-18 PROCEDURE — T1013 SIGN LANG/ORAL INTERPRETER: HCPCS | Mod: U3

## 2017-05-18 PROCEDURE — 99232 SBSQ HOSP IP/OBS MODERATE 35: CPT | Mod: GC | Performed by: PSYCHIATRY & NEUROLOGY

## 2017-05-18 RX ORDER — HALOPERIDOL 0.5 MG/1
0.5 TABLET ORAL AT BEDTIME
Status: DISCONTINUED | OUTPATIENT
Start: 2017-05-18 | End: 2017-05-26

## 2017-05-18 RX ORDER — HALOPERIDOL 2 MG/1
2 TABLET ORAL AT BEDTIME
Status: DISCONTINUED | OUTPATIENT
Start: 2017-05-18 | End: 2017-05-26

## 2017-05-18 ASSESSMENT — ACTIVITIES OF DAILY LIVING (ADL)
ORAL_HYGIENE: INDEPENDENT
DRESS: INDEPENDENT
DRESS: INDEPENDENT
ORAL_HYGIENE: INDEPENDENT
LAUNDRY: WITH SUPERVISION
GROOMING: INDEPENDENT
GROOMING: INDEPENDENT

## 2017-05-18 NOTE — PROGRESS NOTES
Initially seen by OT on this date. Hilda  attended 2 of 2 OT groups today. This a.m. she attended portions of a yoga group, and was able to follow instructions with some modifications for her flexibility level. Was playful and giggly during this session, laughing whenever she found a position challenging. Later in the day she  participated in OT clinic and was able to initiate task, follow through with plan and ask for help as needed.  Worked quietly with good focus on a chosen creative project. Will be given a written self-assessment upon increased group attendance. More observation needed to complete initial evaluation at this time.

## 2017-05-18 NOTE — PLAN OF CARE
Problem: Psychotic Symptoms  Goal: Psychotic Symptoms  Signs and symptoms of listed problems will be absent or manageable.   Pt in lounge watching tv , isolative to others . She refused hs meds , states she does not need them and she refused to have a 1 to 1 check in with staff, states she does not need pills or checking in . She denies voices or si

## 2017-05-18 NOTE — PROGRESS NOTES
"Reec'd pt. sleeping w/o observable distress.  Awakened when staff was checking roommate's BG.  \"what's going on, what are you doing over there?\"  Reassured realistically.  Remained wakeful for some time.  Adamantly refused offer/encouragement for New order for scheduled Haldol at bedtime.  \"No, no medication, I don't need medication, No\".  Remained in bed and was able to return to sleep w/o any problem.    "

## 2017-05-18 NOTE — PROGRESS NOTES
----------------------------------------------------------------------------------------------------------  [facility], [place]   Psychiatric Progress Note     Assessment    Presentation: Hilda Delgadillo is a 46 year old female with a history of unspecified chronic psychosis who [email or web address] after she became agitated and paranoid, refusing to get on the plane and saying her family manipulated her.  She takes risperidone, but has been off of it for 4 days according to her son.    Diagnostic Impression: Hilda Delgadillo is a 46 year old female with a history of unspecified chronic psychosis who was brought in by ambulance by family request as she became more agitated and paranoid [facility] and refused to board the plane to return to her home in [place].  Her MSE is notable for agitation with poor insight and paranoia.  She denies substance abuse.  She was diagnosed with unspecified chronic psychosis in 2015.  She takes risperidone, which family says she takes disguised in drinks and food, but stopped taking her risperidone 4 days ago and told staff that she does not take any medications.   History and presentation of symptoms are consistent with diagnosis of chronic psychosis, likely late onset paranoid schizophrenia during menopausal transition, with decompensation due to cessation of anti-psychotics.    Hospital course: Hilda Delgadillo was admitted to station 22 on a 72 hour hold and placed on status 15.  She refused to take risperidone because she denied ever being on risperidone or having mental health issues.  The risperidone prescription bottle was listed for a name similar to Hilda's and the prescription came from [place], which was also listed under under a different name.  However, family verified [facility] hospitalization, and this patient fit the same description as Hilda for presentation of symptoms, paranoia, and references to  Yvette.   At [facility], she referred to [facility] as her primary support system.  [facility] tried to commit her, but it was not supported by the [facility].  Due to second hospitalization within two months, not having a home to return to, no insight, and refusing to take medications, commitment with vaughan was filed for her.  She was switched over from risperidone to 1 mg PO to haloperidol 2.5 mg qHS with plans to eventually switch her to IM haldol decanoate (CASTELLON).  Family came to visit her and she was very argumentative and agitated during their visit; she continued to deny the need for medication despite persistent delusions during hospitalization.  On 5/17 team was informed that [facility] had supported petition for committment and patient had been placed on a court hold.    Medical course: none    Plan     Principal Diagnosis: Psychosis NEC vs Schizophrenia vs Bipolar currently manic with psychotic features    Medications:   -2.5 mg PO haloperidol, qHS  -2 mg PO haloperidol, q2h PRN for agitation or 5mg IM haloperidol, q2h PRN for agitation  -benztropine 1mg PO BID PRN for EPSE   -benztropine 1mg IM daily  for acute dystonic reaction    Laboratory/Imaging: On admission: UTox negative, CBC, TSH and CMP WNL   -elevated cholesterol 207 and .   -elevated FSH cw menopausal status (LMP 2 years ago)   -risperidone level <0.5; metabolite 2.4  Consults: none  Patient will be treated in therapeutic milieu with appropriate individual and group therapies as described.    Medical diagnoses to be addressed this admission:  none  Plan: N/A  Consults: none    Relevant psychosocial stressors: chronic mental illness, poor medication compliance    Legal Status: Court Hold    Safety Assessment:   Checks: Status 15  Precautions: Suicide, SIB  Pt has not required locked seclusion or restraints in the past 24 hours to maintain safety, please refer to RN documentation for further details.    The risks, benefits, alternatives and side effects  have been discussed and are understood by the patient and other caregivers.    Anticipated Disposition/Discharge Date: pending medication adjustment and mental stabilization    --------------------------------------------------    Scribed by Hugo Batista, MS4, for Dr. Yvan Werner, resident.  Hugo Batista, MS4  [phone]    I, Yvan Werner DO, have reviewed and edited the documentation recorded by the scribe.  This documentation accurately reflects the services I personally performed and treatment decisions made by me in consultation with the attending physician.    Yvan Werner DO  Resident Psychiatrist, PGY-1  Pager: [phone]    Psychiatry Attending Attestation:  This patient has been seen and evaluated by me, Eyal Hand M.D.  The patient's condition and treatment plan were discussed with the resident, and care coordinated with the CTC and RN. I reviewed, edited and agree with the findings and plan in this note.     Eyal Hand M.D.   of Psychiatry     Interim History:   The patient's care was discussed with the treatment team and chart notes were reviewed.     Sleep: 4.75 hours, unable to stay asleep  PRN's: none  Staff Notes: Patient was brushing teeth in kitchen sink yesterday, and ignored repeated redirection by staff.  Later grabbed newspaper that other patients wanted to read and refused redirection to set it down; patient took it to the room and ripped holes in it.  Required mild physical redirection when she refused verbal redirection after she attempted to push pass staff to gain entry into locker room.  Patient had good visit with .  She watched tv, but was isolative.  She refused medication and states that she does not need them or 1 to 1 check ins.  Was awakened by staff when checking roommate's blood glucose; was reassured when she asked what was going on.  She refused new order for bedtime haldol.    Treatment Meeting:  Team discussed behavior with  "Neelamjonoil; she ripped the paper to keep President Jose's picture with her and then she gave it back.  She felt like staff was not listening to her about wanting to get into the locker room for her clothes, which was why she tried to push her way in.  Team discussed importance of listening to staff for everyone's safety and being respectful of the other patients.  Hilda indicated understanding.  Her family came to visit yesterday, and she said she did not like them visiting her.  She said that she does not have a problem with her family, but they have a problem with her.  Team encouraged use of haldol to help her feel better and help with sleep; she denied any sleep problems.  Explained that haldol is different than the risperidone she previously received.  She stated that she does not want to take any medication which will cause her to sleep all day like her roommate.  Team discussed commitment and [place] with her; team advised her that it is believed that she has psychosis and paranoia and that medication is necessary for her to get better.    Review of systems:     The Review of Systems is negative other than noted above.         Medications:     Current Facility-Administered Medications   Medication     haloperidol (HALDOL) tablet 2 mg    And     haloperidol (HALDOL) tablet 0.5 mg     benztropine (COGENTIN) tablet 1 mg     benztropine mesylate (COGENTIN) injection 1 mg     haloperidol lactate (HALDOL) injection 5 mg    Or     haloperidol (HALDOL) tablet 2 mg     acetaminophen (TYLENOL) tablet 650 mg             Allergies:   No Known Allergies         Psychiatric Examination:   /77  Pulse 78  Temp 97.7  F (36.5  C) (Tympanic)  Resp 16  Ht 1.702 m (5' 7\")  Wt 78 kg (172 lb)  BMI 26.94 kg/m2  Weight is 172 lbs 0 oz  Body mass index is 26.94 kg/(m^2).    Appearance:  appeared as age stated, well groomed, wearing hijab and dress  Attitude: more cooperative  Eye Contact:  good  Mood:  More depressed, " distressed, and irritable today  Affect:  mood congruent and intensity is normal  Speech:  clear, coherent, knows some English; softer speech today  Psychomotor Behavior:  no evidence of tardive dyskinesia, dystonia, or tics. Hands shaking as she became more anxious and agitated  Thought Process:  Linear in her responses  Associations:  no loose associations  Thought Content:  Delusions present, both non-bizarre and bizarre.  No SI or auditory hallucinations.  Insight:  very poor  Judgment:  poor  Oriented to:  time, person, and place  Attention Span and Concentration:  intact  Recent and Remote Memory:  limited  Language: limited English; uses Nigerien   Fund of Knowledge: appropriate to level of education  Muscle Strength and Tone: normal  Gait and Station: Normal         Labs:   Risperidone level <0.5  9-hydroxyrisperidone level: 2.4  Combined total risperidone and metabolite: 2.4  FSH 77.8    Recent Labs   Lab Test  05/15/17   0753   CHOL  207*   TRIG  64   LDL  123*   HDL  71     Recent Labs   Lab Test  05/15/17   0753   CR  0.65   GFRESTIMATED  >90  Non  GFR Calc     BUN  10   NA  144   POTASSIUM  3.9   FREDRICK  8.7   GLC  100*     Recent Labs   Lab Test  05/15/17   0753   AST  14   ALT  27   ALKPHOS  84   BILITOTAL  0.3   PROTTOTAL  7.7   ALBUMIN  3.5     Recent Labs   Lab Test  05/15/17   0753   WBC  8.5   HGB  13.5   PLT  196     Recent Labs   Lab Test  05/15/17   0753   TSH  1.09

## 2017-05-18 NOTE — PROGRESS NOTES
Pt was withdrawn, attended some groups. Pt stated she refused her medication today because they made her react badly in the past. Pt denies SI/SIB and hallucinations, states she is doing good today. Pt stated that the only reason she is in the hospital is because of family drama.      05/18/17 1409   Behavioral Health   Hallucinations denies / not responding to hallucinations   Thinking paranoid;distractable   Orientation person: oriented;place: oriented   Memory baseline memory   Insight denial of illness;poor   Judgement impaired   Eye Contact at examiner   Affect full range affect   Mood anxious;irritable   Physical Appearance/Attire attire appropriate to age and situation   Hygiene well groomed   Suicidality other (see comments)  (denies)   Self Injury other (see comment)  (denies )   Activity withdrawn   Speech clear;coherent   Medication Sensitivity no stated side effects;no observed side effects   Psychomotor / Gait balanced;steady   Psycho Education   Type of Intervention 1:1 intervention   Response participates, initiates socially appropriate   Hours 0.5   Treatment Detail Check in    Activities of Daily Living   Hygiene/Grooming independent   Oral Hygiene independent   Dress independent   Laundry with supervision   Room Organization independent

## 2017-05-19 ENCOUNTER — OFFICE VISIT (OUTPATIENT)
Dept: INTERPRETER SERVICES | Facility: CLINIC | Age: 47
End: 2017-05-19

## 2017-05-19 PROCEDURE — 97150 GROUP THERAPEUTIC PROCEDURES: CPT | Mod: GO

## 2017-05-19 PROCEDURE — 12400001 ZZH R&B MH UMMC

## 2017-05-19 PROCEDURE — 90853 GROUP PSYCHOTHERAPY: CPT

## 2017-05-19 PROCEDURE — 99232 SBSQ HOSP IP/OBS MODERATE 35: CPT | Mod: GC | Performed by: PSYCHIATRY & NEUROLOGY

## 2017-05-19 PROCEDURE — T1013 SIGN LANG/ORAL INTERPRETER: HCPCS | Mod: U3

## 2017-05-19 ASSESSMENT — ACTIVITIES OF DAILY LIVING (ADL)
ORAL_HYGIENE: INDEPENDENT
DRESS: STREET CLOTHES;INDEPENDENT
LAUNDRY: WITH SUPERVISION
GROOMING: HANDWASHING;SHOWER;INDEPENDENT

## 2017-05-19 NOTE — PROGRESS NOTES
----------------------------------------------------------------------------------------------------------  [facility], [place]   Psychiatric Progress Note     Assessment    Presentation: Hilda Delgadillo is a 46 year old female with a history of unspecified chronic psychosis who presented from the [facility] after she became agitated and paranoid, refusing to get on the plane and saying her family manipulated her.  She takes risperidone, but has been off of it for 4 days according to her son.    Diagnostic Impression: Hilda Delgadillo is a 46 year old female with a history of unspecified chronic psychosis who was brought in by ambulance by family request as she became more agitated and paranoid while at the [facility] and refused to board the plane to return to her home in [place].  Her MSE is notable for agitation with poor insight and paranoia.  She denies substance abuse.  She was diagnosed with unspecified chronic psychosis in 2015.  She takes risperidone, which family says she takes disguised in drinks and food, but stopped taking her risperidone 4 days ago and told staff that she does not take any medications.   History and presentation of symptoms are consistent with diagnosis of chronic psychosis, likely late onset paranoid schizophrenia during menopausal transition, with decompensation due to cessation of anti-psychotics.    Hospital course: Hilda Delgadillo was admitted to station 22 on a 72 hour hold and placed on status 15.  She refused to take risperidone because she denied ever being on risperidone or having mental health issues.  The risperidone prescription bottle was listed for a name similar to Hilda's and the prescription came from [facility], which was also listed under under a different name.  However, family verified [facility] hospitalization, and this patient fit the same description as Hilda for presentation of symptoms, paranoia, and references to  Yvette.   At [facility], she referred to [facility] as her primary support system.  Sabael tried to commit her, but it was not supported by the [facility].  Due to second hospitalization within two months, not having a home to return to, no insight, and refusing to take medications, commitment with clement was filed for her.  She was switched over from risperidone to 1 mg PO to haloperidol 2.5 mg qHS with plans to eventually switch her to IM haldol decanoate (CASTELLON) but she refused to take any medications.  Family came to visit her and she was very argumentative and agitated during their visit; she continued to deny the need for medication despite persistent delusions during hospitalization.  She intermittently had more aggressive behavior with staff and other peers, and did not respond to redirection.  On 5/17 team was informed that [facility] had supported petition for committment and patient had been placed on a court hold.    Medical course: none    Plan     Principal Diagnosis: Psychosis NEC vs Schizophrenia vs Bipolar currently manic with psychotic features    Medications:   -2.5 mg PO haloperidol, qHS  -2 mg PO haloperidol, q2h PRN for agitation or 5mg IM haloperidol, q2h PRN for agitation  -benztropine 1mg PO BID PRN for EPSE   -benztropine 1mg IM daily  for acute dystonic reaction    Laboratory/Imaging: On admission: UTox negative, CBC, TSH and CMP WNL   -elevated cholesterol 207 and .   -elevated FSH cw menopausal status (LMP 2 years ago)   -risperidone level <0.5; metabolite 2.4  Consults: none  Patient will be treated in therapeutic milieu with appropriate individual and group therapies as described.    Medical diagnoses to be addressed this admission:  none  Plan: N/A  Consults: none    Relevant psychosocial stressors: chronic mental illness, poor medication compliance    Legal Status: Court Hold    Safety Assessment:   Checks: Status 15  Precautions: Suicide, SIB  Pt has not required locked seclusion or  restraints in the past 24 hours to maintain safety, please refer to RN documentation for further details.    The risks, benefits, alternatives and side effects have been discussed and are understood by the patient and other caregivers.    Anticipated Disposition/Discharge Date: pending medication adjustment and mental stabilization    --------------------------------------------------    Scribed by Hugo Batista, MS4, for Dr. Yvan Werner, resident.  Hugo Batista, MS4  [phone]    I, Yvan Werner DO, have reviewed and edited the documentation recorded by the scribe.  This documentation accurately reflects the services I personally performed and treatment decisions made by me in consultation with the attending physician.    Yvan Werner DO  Resident Psychiatrist, PGY-1  Pager: [phone]    This patient has been seen and evaluated by me, Eyal Hand M.D.  The patient's condition and treatment plan were discussed with the resident, and care coordinated with the CTC and RN. I reviewed, edited and agree with the findings and plan in this note.     Eyal Hand M.D.   of Psychiatry   Interim History:   The patient's care was discussed with the treatment team and chart notes were reviewed.     Sleep: 4.5 hours, unable to stay asleep  PRN's: none  Staff Notes: She was withdrawn, but attended some groups.  She refused her medication and told staff that it made her react badly in the past.  Said she is doing good and the only reason she is in the hospital is because of family drama.  She denied hallucinations, SI, or SIB.    Treatment Meeting:  Patient met with the treatment team in the conference room this AM.  She said that she is doing well.  She says her sleep is fine.  She has new clothes from her family; they spoke to her on the phone yesterday and it was a short conversation.  States that in the past, medicines have made her not sleep or eat; team reassured her that this was a  "different medicine than what she took in [facility] (haldol and not risperidone).  States she still does not want to take it, and said that she does not need medication.  She feels healthy.  When asked how others feel when she takes medicine, she said her family is desperate for her to take her medication.  She says it is strange what they ask her when she is not taking her medication.  Team explained that family and team believes she has a mental illness; she smirked and said that she does not.  Team explained to her why they thought she had a mental illness, including her relationship with Yvette and confusion about her visa.  She again reiterated that they needed to stay in the [place] because it is not safe for her son, that he is addicted to drugs, and people have threatened to kill them.  Team discussed that some of her thoughts are resulting from her paranoia; she denies any paranoia.  She says that her sister is the one with the problem, and her family is wrong.  She says the sister's house is full of trash, which is unhealthy; repeated that several times at end of interview.  Discussed with her that she will likely go to court next week about her civil commitment.    Review of systems:     The Review of Systems is negative other than noted above.         Medications:     Current Facility-Administered Medications   Medication     haloperidol (HALDOL) tablet 2 mg    And     haloperidol (HALDOL) tablet 0.5 mg     benztropine (COGENTIN) tablet 1 mg     benztropine mesylate (COGENTIN) injection 1 mg     haloperidol lactate (HALDOL) injection 5 mg    Or     haloperidol (HALDOL) tablet 2 mg     acetaminophen (TYLENOL) tablet 650 mg             Allergies:   No Known Allergies         Psychiatric Examination:   /77  Pulse 78  Temp 98.4  F (36.9  C)  Resp 14  Ht 1.702 m (5' 7\")  Wt 78 kg (172 lb)  BMI 26.94 kg/m2  Weight is 172 lbs 0 oz  Body mass index is 26.94 kg/(m^2).    Appearance:  appeared as age stated, " well groomed, wearing new hijab and dress.   Attitude:  cooperative  Eye Contact:  good  Mood:  Anxious, More irritable today; less depressed  Affect:  intensity is blunted. Neutral. Mood congruent to incongruent; would smile or smirk at inappropriate times periodically during interview.    Speech:  clear, coherent, knows some English  Psychomotor Behavior:  no evidence of tardive dyskinesia, dystonia, or tics. Hands shaking as she became more anxious and agitated  Thought Process:  Linear in her responses, but some circumstantiality when discussing her family  Associations:  no loose associations  Thought Content:  Delusions present, both non-bizarre and bizarre.  No SI or auditory hallucinations.  Insight:  very poor  Judgment:  poor  Oriented to:  time, person, and place  Attention Span and Concentration:  intact  Recent and Remote Memory:  limited  Language: limited English; uses [place]orese   Fund of Knowledge: appropriate to level of education  Muscle Strength and Tone: normal  Gait and Station: Normal         Labs:   Risperidone level <0.5  9-hydroxyrisperidone level: 2.4  Combined total risperidone and metabolite: 2.4  FSH 77.8    Recent Labs   Lab Test  05/15/17   0753   CHOL  207*   TRIG  64   LDL  123*   HDL  71     Recent Labs   Lab Test  05/15/17   0753   CR  0.65   GFRESTIMATED  >90  Non  GFR Calc     BUN  10   NA  144   POTASSIUM  3.9   FREDRICK  8.7   GLC  100*     Recent Labs   Lab Test  05/15/17   0753   AST  14   ALT  27   ALKPHOS  84   BILITOTAL  0.3   PROTTOTAL  7.7   ALBUMIN  3.5     Recent Labs   Lab Test  05/15/17   0753   WBC  8.5   HGB  13.5   PLT  196     Recent Labs   Lab Test  05/15/17   0753   TSH  1.09

## 2017-05-19 NOTE — PROGRESS NOTES
05/18/17 2226   Behavioral Health   Hallucinations denies / not responding to hallucinations   Thinking poor concentration   Orientation person: oriented   Memory baseline memory   Insight denial of illness   Eye Contact at examiner   Affect blunted, flat   Mood mood is calm   Suicidality other (see comments)  (pt denies)   Self Injury other (see comment)  (pt denies)   Activity other (see comment)  (community meeting/ watched movie)   Activities of Daily Living   Hygiene/Grooming independent   Oral Hygiene independent   Dress independent   Room Organization independent   Behavioral Health Interventions   Psychotic Symptoms maintain safety precautions;maintain safe secure environment   Social and Therapeutic Interventions (Psychotic Symptoms) encourage socialization with peers;encourage effective boundaries with peers;encourage participation in therapeutic groups and milieu activities

## 2017-05-19 NOTE — PROGRESS NOTES
Behavioral Health  Note   Behavioral Health  Spirituality Group Note     Unit 22    Name: Hilda Delgadillo    YOB: 1970   MRN: [id number]    Age: 46 year old     Patient attended -led group, which included discussion of spirituality, coping with illness and building resilience.   Patient attended group for 0.5 hrs.   patient minimally participated, but was respectful to the group process.     Christine [facility]  Staff    Page [phone]

## 2017-05-20 ENCOUNTER — OFFICE VISIT (OUTPATIENT)
Dept: INTERPRETER SERVICES | Facility: CLINIC | Age: 47
End: 2017-05-20

## 2017-05-20 PROCEDURE — T1013 SIGN LANG/ORAL INTERPRETER: HCPCS | Mod: U3

## 2017-05-20 PROCEDURE — 12400001 ZZH R&B MH UMMC

## 2017-05-20 PROCEDURE — 90853 GROUP PSYCHOTHERAPY: CPT

## 2017-05-20 ASSESSMENT — ACTIVITIES OF DAILY LIVING (ADL)
DRESS: INDEPENDENT
LAUNDRY: WITH SUPERVISION
DRESS: STREET CLOTHES;INDEPENDENT
GROOMING: HANDWASHING;SHOWER;INDEPENDENT
LAUNDRY: WITH SUPERVISION
ORAL_HYGIENE: INDEPENDENT
GROOMING: INDEPENDENT
ORAL_HYGIENE: INDEPENDENT

## 2017-05-20 NOTE — PROGRESS NOTES
Attended OT session focused on topic of the Process of Recovery. She offered comments, answers mostly in context. She asked questions about meaning of words to clarify as she seemed to need. When she asked a peer about why peer was here, she seemed accepting of redirection to peer's confidentiality. She also seemed accepting of reminder of not touching peers after this was discussed also. (As she had put her hand on peer's shoulder in support when peer appeared upset)

## 2017-05-20 NOTE — PROGRESS NOTES
"   05/20/17 1400   Behavioral Health   Hallucinations denies / not responding to hallucinations   Thinking poor concentration   Orientation person: oriented;place: oriented   Memory baseline memory   Insight poor   Judgement impaired   Eye Contact at examiner   Affect full range affect   Mood mood is calm   Physical Appearance/Attire neat   Hygiene well groomed   Suicidality other (see comments)  (denies )   Self Injury other (see comment)  (denies )   Activity other (see comment)  (attended group and socialized with others)   Speech clear   Activities of Daily Living   Hygiene/Grooming independent   Oral Hygiene independent   Dress independent   Laundry with supervision   Room Organization independent       Patient denies SI and SIB \"never.\"  Patient reported \" I feel good.\"  Patient seems calm, visible in the milieu, attended group and socialized with others.  Patient daily goal \" I was thinking about going outside.\"  Patient goal after discharge \" I'm ok.\"  Patient   Reported no nutritional concerns.  Patient is independent with ADL's.  Patient wants visitors.  Patient wants to asked the doctor if she can go outside for fresh air.  "

## 2017-05-20 NOTE — PLAN OF CARE
"Problem: Psychotic Symptoms  Goal: Psychotic Symptoms  Signs and symptoms of listed problems will be absent or manageable.   Hilda, with  present, said that she does not need Haldol, any medicine.  It is an accident that she is here.  She was on her way to the airport, then her family said that she needed to go to [place] and she did not have her passport or her money prepared.  They left her, she said, and she was there alone asking people for help and sitting in different places.  Her sister came with some belongings or helped her with her suitcase? And told the security that she (Hilda) was crazy,  And that it why she is here - by accident.  She alternately has arguments and then friendly conversations with female [place] peer.  Boundaries not firm and the peer states that Hilda comes into her room frequently.   Moon was seen with tears streaming down her face, eyes very red and full of tears.  She was watching a show where an alligator or crock was getting vet. Care.  \"  I don't like reptiles but I was not crying.\".        "

## 2017-05-20 NOTE — PROGRESS NOTES
Patient slept 4 hours overnight. Patient extremely disruptive to new roommate with loud talking and praying on her prayer rug. After new roommate complained staff asked patient to continue her prayers in the lounge. She refused to leave her room so roommate was offered to lie down in the lounge which she did for awhile. Will continue to monitor and assess.

## 2017-05-20 NOTE — PLAN OF CARE
Problem: Psychotic Symptoms  Goal: Psychotic Symptoms  Signs and symptoms of listed problems will be absent or manageable.   Outcome: No Change  The interpretor arrived on unit at approximately 17:10. Community meeting was going on. A video was showing about forgiveness and love. This nurse encouraged Hilda to join the group and encouraged the interpretor to join group to assist. Hilda appeared apprehensive and held back from joining the group, the interpretor at her side. After about 20 minutes of her standing on periphery of the group, the interpretor excused himself and left unit. Hilda had court papers in hand, asking if these could be explained. After interpretor left, this nurse offered to read the court papers in English, but Hilda declined.

## 2017-05-21 ENCOUNTER — OFFICE VISIT (OUTPATIENT)
Dept: INTERPRETER SERVICES | Facility: CLINIC | Age: 47
End: 2017-05-21

## 2017-05-21 PROCEDURE — T1013 SIGN LANG/ORAL INTERPRETER: HCPCS | Mod: U3

## 2017-05-21 PROCEDURE — 12400001 ZZH R&B MH UMMC

## 2017-05-21 ASSESSMENT — ACTIVITIES OF DAILY LIVING (ADL)
ORAL_HYGIENE: PROMPTS
GROOMING: PROMPTS
DRESS: STREET CLOTHES

## 2017-05-21 NOTE — PLAN OF CARE
"Problem: Psychotic Symptoms  Goal: Psychotic Symptoms  Signs and symptoms of listed problems will be absent or manageable.   Outcome: No Change     Client met with this nurse and interpretor this evening for nearly an hour. We reviewed her court papers. She objected to many of the statements on the papers. For example, she said that she has never met Yvette. She also denied saying that she wanted to  him. She said that she did write him a letter and would like to meet him, hoping that he could help her. Reading her date of birth, she objected to her age being 46. She said \"I am 45.\" She agreed with her date of birth being [birth year]. She continues to insist that she left [place] because she wanted to save her son. She says one is involved with gangs and the other is afraid to leave the apartment. Says her ex  in [facility] is \"a bad man.\" She seems to believe that if she is simply allowed to leave the hospital, enter the community, receive some housing and job assistance- that she will be fine. Continues to deny any MI or ever taking medication. She does not seem to believe that her family was able to give her liquid medication in her drinks. Says if she needs to leave the [place], she will not go to [place], but to the [place]. She continues to believe that her entire family is against her and wants nothing to do with them.     20:37 Client was praying loudly in her room which seemed to bother her room mate. Then, she approached a nurse to complain about the disarray of the room. Hilda's side is neat, the other lady's belongings were piled upon her dresser, an item on the floor, but not in the way of foot traffic. This nurse advised Hilda to ignore the messy side of the room, as only her side is her concern. Tried to explain that we cannot control other adults. She seemed quite upset, talking loudly and fast. She asked \"Do you have a house?\" \"Would you allow that?\" Client may end up requiring a private room " due to her attempts to control room mates.

## 2017-05-21 NOTE — PROGRESS NOTES
Pt sleeping when staff arrived. Pt up around 0230am and was irritable about her roommates CPAP machine beeping (machine was beeping from roommate turning it off) pt then requested for milk and slice of bread. Pt ate and drank 100% of it. Pt ok going back to bed but was restless afterwards getting around 3 hours of sleep the whole night. Pt up at 5am, pre-occupied with washing body/face and persistent on praying in her room unable to redirect pt out of room to be respectful of noice for roommate while she sleeps. Hard to tell if pt is unaware of being disruptive while praying or doesn't care. Pt then pacing loya the rest of morning.

## 2017-05-21 NOTE — PROGRESS NOTES
"Patient states she is aware of court tomorrow, and further stated to \"Wish her luck.\" Appetite good. Patient states she is sleeping well at night. Cooperative. Mood even.         05/21/17 1500   Behavioral Health   Hallucinations denies / not responding to hallucinations   Thinking distractable   Orientation situation, disoriented;person: oriented;place: oriented   Memory baseline memory   Insight (Limited)   Judgement impaired   Affect full range affect   Mood anxious   Physical Appearance/Attire neat;attire appropriate to age and situation   Hygiene well groomed   Activity restless;withdrawn   Speech pressured;clear   Safety   Suicidality status 15     "

## 2017-05-21 NOTE — PLAN OF CARE
Problem: Psychotic Symptoms  Goal: Psychotic Symptoms  Signs and symptoms of listed problems will be absent or manageable.   Hilda was praying in her room when  arrived.  Again saying it was an accident that she was here, she did not need any medication.  She said that her roommate's machine (c pap) did make a loud noise during the night and that bothered her very much.  She did agree to pray in [facility] with her prayer rug.  She agreed to wait for  to bring prayer beads that were under 6 inches in length.  She thought it a very good idea when a room change was mentioned and she was left in her room with other bed blocked (see night charting).

## 2017-05-21 NOTE — PROGRESS NOTES
"SPIRITUAL HEALTH SERVICES  SPIRITUAL ASSESSMENT Progress Note  [facility] ([facility]) 22N     REFERRAL SOURCE: Patient request through Epic consult for \"She has some prayer beads that are longer than 6 inches. Could you provide prayer beads 6 inches or shorter so that she can use them on the unit (per safety rules?) Client is Adventism and prefers Adventism .\"    I spoke with staff on 22N and shared that I am unsure if we have prayer beads that are shorter than 6 inches but that I would notify staff Carlos mckeon, and ask that he follow-up early this week regarding possibility of shorter prayer beads.    PLAN: Staff Carlos mckeon will be notified of request.  [facility] remains available for the duration of Hilda's hospitalization.     Cedric Edouard MDiv.  Chaplain Resident  Pager [phone]  "

## 2017-05-22 ENCOUNTER — OFFICE VISIT (OUTPATIENT)
Dept: INTERPRETER SERVICES | Facility: CLINIC | Age: 47
End: 2017-05-22

## 2017-05-22 PROCEDURE — 90853 GROUP PSYCHOTHERAPY: CPT

## 2017-05-22 PROCEDURE — 99232 SBSQ HOSP IP/OBS MODERATE 35: CPT | Mod: GC | Performed by: PSYCHIATRY & NEUROLOGY

## 2017-05-22 PROCEDURE — 12400001 ZZH R&B MH UMMC

## 2017-05-22 PROCEDURE — T1013 SIGN LANG/ORAL INTERPRETER: HCPCS | Mod: U3

## 2017-05-22 ASSESSMENT — ACTIVITIES OF DAILY LIVING (ADL)
ORAL_HYGIENE: INDEPENDENT
DRESS: STREET CLOTHES;INDEPENDENT
GROOMING: INDEPENDENT
ORAL_HYGIENE: INDEPENDENT
GROOMING: INDEPENDENT
LAUNDRY: WITH SUPERVISION
DRESS: INDEPENDENT

## 2017-05-22 NOTE — PROGRESS NOTES
05/22/17 1306   Visit Information   Visit Made By Staff    Type of Visit Follow-up   Visited Patient   Interventions   Basic Spiritual Interventions   Assessment of spiritual needs/resources;Reflective conversation;Life Review;Prayer   Advanced Assessments/Interventions   Presenting Concerns/Issues Spiritual/Temple/emotional support;Family dynamics;Challenged coping   SPIRITUAL HEALTH SERVICES Progress Note  [facility] ( [facility] [id number]         DATA:    Visited with pt on the basis of triaged for spiritual support of the pt. Reflected with pt around her hospital experience, sources of spiritual and emotional support and current spiritual health needs. Pt talked about her current situation and what it means for her. Pt reported that she came from a court because she refuses to take medication.  During my conversation with her I let her know that I could be support of her.       INTERVENTION:      Emotional support. Reflective conversation integrating illness elements and family spiritual narratives. I encouraged her to follow the doctor s advised. Also, I encouraged her to reconnect to her family.      OUTCOME:    Pt received spiritual support and reflective conversation in the context of this hospitalization. The pt expressed appreciation for the visit and the encouragement that she felt.       PLAN:    Will continue to provide support to pt/family during their hospitalization at least 1x/wk.                                                                                                                                             Carlos Hernandez  Chaplain Resident  Pager [phone]

## 2017-05-22 NOTE — PLAN OF CARE
Problem: Psychotic Symptoms  Goal: Psychotic Symptoms  Signs and symptoms of listed problems will be absent or manageable.  -pt will verbalize coping skills to manage hallucinations  -pt will participate in groups demonstrating adequate attention span  -pt will identify when or if they are having any medication side effects  -pt will be able to have reality based conversations  Outcome: No Change  Astahil states she feels well-attended court hearing and states it went well-again dismissing any concerns of family regarding her mental health-pleasant in interactions with staff-continues to talk of relationship with [place]

## 2017-05-22 NOTE — PROGRESS NOTES
"INITIAL O.T. ASSESSMENT: Hilda has attended 6 OT sessions since admission. Pleasant demeanor. Sometimes requires assistance in initiating projects and participation in discussion. During a discussion group on giving and receiving gifts, she reported that Alison Crawford had given her \"gifts, love, flowers.\"     Was given and completed a written self assessment in conversation with this writer. Cited \"strange circumstances\" as the reason for current hospitalization (did not elaborate). When asked what staff can do to be most helpful during her hospitalization, pt responded \"what you do now.\" When asked about her support system, she stated \"when I  my , all my family was against me.\"     Goals prioritized for hospitalization (selected from list): Self-care, Sleep quality, Personal safety, Focus & concentration  Goals prioritized for hospitalization (self-described): \"I have court.\" (did not elaborate)    OT staff explained the purpose of pt being involved in current treatment plan.      Plan: Encourage ongoing attendance as able to meet self-stated goals, implement positive coping skills, engage in graded opportunities for success, and provide assessment ongoing.       05/22/17 1600   Clinical Impression   Affect Appropriate to situation;Fluctuates   Orientation Needs further assessment   Appearance and ADLs Needs further assessment   Attention to Internal Stimuli Appears distracted/preoccupied internally   Interaction Skills Interacts appropriately with staff;Interacts appropriately with peers   Ability to Communicate Needs Needs further assessment   Verbal Content [place];Selective;Delusional    Ability to Maintain Boundaries Maintains appropriate physical boundaries;Maintains appropriate verbal boundaries   Participation Participates with minimal encouragement   Concentration Concentrates 5-10 minutes   Ability to Concentrate With structure;Easily distracted   Follows and Comprehends Directions " Independently follows 1 step verbal directions;Follows 1 step with demonstration   Memory Needs further assessment   Organization Independently organizes simple tasks;Needs occasional assistance    Decision Making Independent;Follows the leads of peers   Planning and Problem Solving Occasionally needs assist/feedback   Ability to Apply and Learn Concepts Comprehends concepts, but needs assist to apply;Needs further assessment   Frustrations / Stress Tolerance Needs further assessment   Level of Insight Needs further assessment   Self Esteem Takes risks with support and encouragement;Discredits self/work   Social Supports Needs further assessment

## 2017-05-22 NOTE — PROGRESS NOTES
----------------------------------------------------------------------------------------------------------  [facility], [place]   Psychiatric Progress Note     Assessment    Presentation: Hilda Delgadillo is a 46 year old female with a history of unspecified chronic psychosis who [facility] after she became agitated and paranoid, refusing to get on the plane and saying her family manipulated her.  She takes risperidone, but has been off of it for 4 days according to her son.    Diagnostic Impression: Hilda Delgadillo is a 46 year old female with a history of unspecified chronic psychosis who was brought in by ambulance by family request as she became more agitated and paranoid [facility] and refused to board the plane to return to her home in [place].  Her MSE is notable for agitation with poor insight and paranoia.  She denies substance abuse.  She was diagnosed with unspecified chronic psychosis in 2015.  She takes risperidone, which family says she takes disguised in drinks and food, but stopped taking her risperidone 4 days ago and told staff that she does not take any medications.   History and presentation of symptoms are consistent with diagnosis of chronic psychosis, likely late onset paranoid schizophrenia during menopausal transition, with decompensation due to cessation of anti-psychotics.    Hospital course: Hilda Delgadillo was admitted to station 22 on a 72 hour hold and placed on status 15.  She refused to take risperidone because she denied ever being on risperidone or having mental health issues.  The risperidone prescription bottle was listed for a name similar to Hilda's and the prescription came from [place], which was also listed under under a different name.  However, family verified [place] hospitalization, and this patient fit the same description as Hilda for presentation of symptoms, paranoia, and references to  Yvette.   At [facility], she referred to [facility] as her primary support system.  Adrian tried to commit her, but it was not supported by the [facility].  Due to second hospitalization within two months, not having a home to return to, no insight, and refusing to take medications, commitment with clement was filed for her.  She was switched over from risperidone 1 mg PO to haloperidol 2.5 mg qHS with plans to eventually switch her to IM haldol decanoate (CASTELLON) but she refused to take any medications.  Family came to visit her and she was very argumentative and agitated during their visit; she continued to deny the need for medication despite persistent delusions during hospitalization.  She intermittently had more aggressive behavior with staff and other peers, and did not respond to redirection.  On 5/17 team was informed that [facility] had supported petition for committment and patient had been placed on a court hold.  Patient had problems with her roommate last week, as well as a new roommate over the weekend; would intermittently not respond to redirection or would become agitated and question redirection.  She was given a single room.  Had her first court hearing Monday, 5/22.    Medical course: none    Plan     Principal Diagnosis: Psychosis NEC vs Schizophrenia vs Bipolar currently manic with psychotic features    Medications:   -haloperidol 2.5 mg PO qHS  -haloperidol 2mg PO q2h PRN for agitation or haloperidol 5mg IM q2h PRN for agitation  -benztropine 1mg PO BID PRN for EPSE   -benztropine 1mg IM daily  for acute dystonic reaction    Laboratory/Imaging: On admission: UTox negative, CBC, TSH and CMP WNL   -elevated cholesterol 207 and .   -elevated FSH cw menopausal status (LMP 2 years ago)   -risperidone level <0.5; metabolite 2.4  Consults: none  Patient will be treated in therapeutic milieu with appropriate individual and group therapies as described.    Medical diagnoses to be addressed this admission:   none  Plan: N/A  Consults: none    Relevant psychosocial stressors: chronic mental illness, poor medication compliance    Legal Status: Court Hold    Safety Assessment:   Checks: Status 15  Precautions: Suicide, SIB, single room  Pt has not required locked seclusion or restraints in the past 24 hours to maintain safety, please refer to RN documentation for further details.    The risks, benefits, alternatives and side effects have been discussed and are understood by the patient and other caregivers.    Anticipated Disposition/Discharge Date: pending medication adjustment and mental stabilization    --------------------------------------------------    Scribed by Hugo Batista, MS4, for Dr. Yvan Werner, resident.  Hugo Batista MS4  [phone]    I, Yvan Werner DO, have reviewed and edited the documentation recorded by the scribe.  This documentation accurately reflects the services I personally performed and treatment decisions made by me in consultation with the attending physician.    Yavn Werner DO  Resident Psychiatrist, PGY-1  Pager: [phone]  Psychiatry Attending Attestation:  This patient has been seen and evaluated by me, Eyal Hand M.D.  The patient's condition and treatment plan were discussed with the resident, and care coordinated with the CTC and RN. I reviewed, edited and agree with the findings and plan in this note.     Eyal Hand M.D.   of Psychiatry   Interim History:   The patient's care was discussed with the treatment team and chart notes were reviewed.     Sleep: 4.75 hours, unable to stay asleep  PRN's: none  Staff Notes: Saturday, patient reviewed court papers; she objected to many of the statements and continued to deny mental illness or prior medication.  Patient was very concerned and upset with roommate's side of room being messy. Patient was up at around 2:30 AM on Sunday and irritable about her roommate's CPAP.  Persistent on praying in her  room at 5 AM, and unable to redirect patient out of room to be respectful of noise for roommate while she sleeps.  Staff could not tell if she is unaware of disruption or does not care.  Patient is aware of Monday court hearing.  Patient was praying in room and when staff came, she said it was an accident; she agreed to pray in [facility] and she thought it was a good idea when a room change was mentioned.  She continued to deny an illness and refuse medications; states that family is out to get her.  At times, she was confused, distractable, paranoid, and irritable.     Treatment Meeting:  Patient said that hearing went well.  Team discussed refusing medications with her; she still does not believe she needs medication.  Denies any SI, delusions, or paranoia.  Team discussed weekend with her and prior comments.  She told team she never said she wanted to  [place], and that she has never actually met him, but she has a letter from him.  Her family visited yesterday, but patient said the visit did not go well and her family is not working to help her.  She told team that she is worried about her son, but he is not worried about her.  She worries about his safety in [place].  Team discussed roommate problems with her from the weekend; she said the machine her roommate used had an alarm that kept waking her up.  She denied praying when her roommate was trying to sleep; she told the team that at 5 AM her roommate was awake while she was praying, so she did not believe there was a problem with it.  Team was supportive of her prayer time, but endorsed being respectful with noise when others are sleeping.  With team, she seemed unaware and/or oblivious to if she had been causing any problems with her roommate.  She wanted to know if she could go outside for fresh air, but team explained to her that she cannot go outside while her legal status is still pending.  Patient understood.  She thanked team for their time this  "morning.    Review of systems:     The Review of Systems is negative other than noted above.         Medications:     Current Facility-Administered Medications   Medication     haloperidol (HALDOL) tablet 2 mg    And     haloperidol (HALDOL) tablet 0.5 mg     benztropine (COGENTIN) tablet 1 mg     benztropine mesylate (COGENTIN) injection 1 mg     haloperidol lactate (HALDOL) injection 5 mg    Or     haloperidol (HALDOL) tablet 2 mg     acetaminophen (TYLENOL) tablet 650 mg          Allergies:   No Known Allergies         Psychiatric Examination:   /77  Pulse 78  Temp 98  F (36.7  C)  Resp 16  Ht 1.702 m (5' 7\")  Wt 78 kg (172 lb)  BMI 26.94 kg/m2  Weight is 172 lbs 0 oz  Body mass index is 26.94 kg/(m^2).    Appearance:  appeared as age stated, well groomed, wearing hijab and dress. Carries her glasses with her.  Attitude:  cooperative  Eye Contact:  good  Mood:  Less anxious today, more subdued than previous visits.  Not irritable or agitated when discussing Obama, family or court.  Affect:  intensity is blunted. Neutral. Mood congruent.   Speech:  clear, coherent, knows some English, regular rate and rhythm  Psychomotor Behavior:  no evidence of tardive dyskinesia, dystonia, or tics.  Thought Process:  Linear in her responses  Associations:  no loose associations  Thought Content:  Delusions present, non-bizarre.  No SI or auditory hallucinations.  Insight:  very poor  Judgment:  poor  Oriented to:  time, person, and place  Attention Span and Concentration:  intact  Recent and Remote Memory:  limited  Language: limited English; uses Kyrgyz   Fund of Knowledge: appropriate to level of education  Muscle Strength and Tone: normal  Gait and Station: Normal         Labs:   Risperidone level <0.5  9-hydroxyrisperidone level: 2.4  Combined total risperidone and metabolite: 2.4  FSH 77.8    Recent Labs   Lab Test  05/15/17   0753   CHOL  207*   TRIG  64   LDL  123*   HDL  71     Recent Labs   Lab " Test  05/15/17   0753   CR  0.65   GFRESTIMATED  >90  Non  GFR Calc     BUN  10   NA  144   POTASSIUM  3.9   FREDRICK  8.7   GLC  100*     Recent Labs   Lab Test  05/15/17   0753   AST  14   ALT  27   ALKPHOS  84   BILITOTAL  0.3   PROTTOTAL  7.7   ALBUMIN  3.5     Recent Labs   Lab Test  05/15/17   0753   WBC  8.5   HGB  13.5   PLT  196     Recent Labs   Lab Test  05/15/17   0753   TSH  1.09

## 2017-05-22 NOTE — PROGRESS NOTES
05/21/17 1930   Behavioral Health   Hallucinations denies / not responding to hallucinations   Thinking distractable;confused;paranoid   Orientation situation, disoriented;place: oriented   Memory baseline memory   Insight poor;denial of illness   Judgement impaired   Eye Contact at examiner   Mood anxious;irritable;other (see comments)  (Frustrated with family)   Physical Appearance/Attire neat   Hygiene well groomed   Suicidality other (see comments)  (Denies)   Self Injury other (see comment)  (Denies/none observed)   Activity restless;withdrawn;refusal   Speech pressured;clear   Medication Sensitivity other (see comment)  (Refuses meds)   Psychomotor / Gait balanced;steady   Activities of Daily Living   Hygiene/Grooming prompts   Oral Hygiene prompts   Dress street clothes   Room Organization independent     Met with pt and  to gain insight on mental status. Pt denies illness and continues to refuse medications. She states her family is out to get her and does not feel safe under their care.

## 2017-05-23 ENCOUNTER — OFFICE VISIT (OUTPATIENT)
Dept: INTERPRETER SERVICES | Facility: CLINIC | Age: 47
End: 2017-05-23

## 2017-05-23 PROCEDURE — 25000132 ZZH RX MED GY IP 250 OP 250 PS 637: Performed by: PSYCHIATRY & NEUROLOGY

## 2017-05-23 PROCEDURE — 12400001 ZZH R&B MH UMMC

## 2017-05-23 PROCEDURE — 97150 GROUP THERAPEUTIC PROCEDURES: CPT | Mod: GO

## 2017-05-23 PROCEDURE — T1013 SIGN LANG/ORAL INTERPRETER: HCPCS | Mod: U3

## 2017-05-23 PROCEDURE — 99232 SBSQ HOSP IP/OBS MODERATE 35: CPT | Mod: GC | Performed by: PSYCHIATRY & NEUROLOGY

## 2017-05-23 ASSESSMENT — ACTIVITIES OF DAILY LIVING (ADL)
DRESS: INDEPENDENT
GROOMING: INDEPENDENT
ORAL_HYGIENE: INDEPENDENT

## 2017-05-23 NOTE — PROGRESS NOTES
05/22/17 2237   Behavioral Health   Hallucinations denies / not responding to hallucinations   Thinking paranoid   Orientation person: oriented;place: oriented   Memory baseline memory   Insight poor   Judgement impaired   Eye Contact at examiner   Affect full range affect   Mood mood is calm   Physical Appearance/Attire attire appropriate to age and situation   Hygiene other (see comment)  (adequate)   Suicidality other (see comments)  (denies)   Self Injury other (see comment)  (denies)   Activity other (see comment);withdrawn  (visible on the unit)   Speech coherent;clear   Medication Sensitivity no stated side effects;no observed side effects   Psychomotor / Gait balanced;steady   Psycho Education   Type of Intervention 1:1 intervention   Response participates with encouragement   Hours 0.5   Treatment Detail IMR- triggers   Activities of Daily Living   Hygiene/Grooming independent   Oral Hygiene independent   Dress street clothes;independent   Room Organization independent   Behavioral Health Interventions   Psychotic Symptoms maintain safety precautions;encourage participation / independence with adls   Social and Therapeutic Interventions (Psychotic Symptoms) encourage socialization with peers;encourage participation in therapeutic groups and milieu activities   Writer spoke with staff through an . Pt reported that court went well this morning and that she will be leaving on Thursday. Pt reported that her family is frustrating and bother her. She said she does not want to go back to [place], because her son has gotten into gangs and does not listen to her. She denied any mental health problems.

## 2017-05-23 NOTE — PROGRESS NOTES
----------------------------------------------------------------------------------------------------------  [facility], [place]   Psychiatric Progress Note     Assessment    Presentation: Hilda Delgadillo is a 46 year old female with a history of unspecified chronic psychosis who presented from the [facility] after she became agitated and paranoid, refusing to get on the plane and saying her family manipulated her.  She takes risperidone, but has been off of it for 4 days according to her son.    Diagnostic Impression: Hilda Delgadillo is a 46 year old female with a history of unspecified chronic psychosis who was brought in by ambulance by family request as she became more agitated and paranoid while at the [facility] and refused to board the plane to return to her home in [place].  Her MSE is notable for agitation with poor insight and paranoia.  She denies substance abuse.  She was diagnosed with unspecified chronic psychosis in 2015.  She takes risperidone, which family says she takes disguised in drinks and food, but stopped taking her risperidone 4 days ago and told staff that she does not take any medications.   History and presentation of symptoms are consistent with diagnosis of chronic psychosis, likely late onset paranoid schizophrenia during menopausal transition, with decompensation due to cessation of anti-psychotics.    Hospital course: Hilda Delgadillo was admitted to station 22 on a 72 hour hold and placed on status 15.  She refused to take risperidone because she denied ever being on risperidone or having mental health issues.  The risperidone prescription bottle was listed for a name similar to Hilda's and the prescription came from [place], which was also listed under under a different name.  However, family verified [facility] hospitalization, and this patient fit the same description as Hilda for presentation of symptoms, paranoia, and references to  Yvette.   At [facility], she referred to Yvette as her primary support system.  [place] tried to commit her, but it was not supported by the [facility].  Due to second hospitalization within two months, not having a home to return to, no insight, and refusing to take medications, commitment with clement was filed for her.  She was switched over from risperidone 1 mg PO to haloperidol 2.5 mg qHS with plans to eventually switch her to IM haldol decanoate (CASTELLON) but she refused to take any medications.  Family came to visit her and she was very argumentative and agitated during their visit; she continued to deny the need for medication despite persistent delusions during hospitalization.  She intermittently had more aggressive behavior with staff and other peers, and did not respond to redirection.  On 5/17 team was informed that [facility] had supported petition for committment and patient had been placed on a court hold.  Patient had problems with her roommate last week, as well as a new roommate over the weekend; would intermittently not respond to redirection or would become agitated and question redirection.  She reported at times a special relationship with Alison Crawford, e.g., he had sent her a personal letter telling her she could stay in the [place], or sent her flowers; at other times, she denied saying or believing these ideas. She was given a single room.  Had her first court hearing Monday, 5/22.     Medical course: None.    Plan     Principal Diagnosis: Psychosis NEC vs Schizophrenia vs Bipolar currently manic with psychotic features    Medications:   -haloperidol 2.5 mg PO qHS (refusing consistently)  -haloperidol 2mg PO q2h PRN for agitation or haloperidol 5mg IM q2h PRN for agitation  -benztropine 1mg PO BID PRN for EPSE   -benztropine 1mg IM daily  for acute dystonic reaction    Laboratory/Imaging: On admission: UTox negative, CBC, TSH and CMP WNL   -elevated cholesterol 207 and .   -elevated FSH cw menopausal  status (LMP 2 years ago)   -risperidone level <0.5; metabolite 2.4  Consults: none  Patient will be treated in therapeutic milieu with appropriate individual and group therapies as described.    Medical diagnoses to be addressed this admission:  none  Plan: N/A  Consults: none    Relevant psychosocial stressors: chronic mental illness, poor medication compliance    Legal Status: Court Hold    Safety Assessment:   Checks: Status 15  Precautions: Suicide, SIB, single room  Pt has not required locked seclusion or restraints in the past 24 hours to maintain safety, please refer to RN documentation for further details.    The risks, benefits, alternatives and side effects have been discussed and are understood by the patient and other caregivers.    Anticipated Disposition/Discharge Date: pending medication adjustment and mental stabilization    --------------------------------------------------    Scribed by Hugo Batista, MS4, for Dr. Yvan Werner, resident.  ADRIENNE Khoury  [phone]    I, Yvan Werner DO, have reviewed and edited the documentation recorded by the scribe.  This documentation accurately reflects the services I personally performed and treatment decisions made by me in consultation with the attending physician.    Yvan Werner DO  Resident Psychiatrist, PGY-1  Pager: [phone]    Psychiatry Attending Attestation:  This patient has been seen and evaluated by me, Eyal Hand M.D.  The patient's condition and treatment plan were discussed with the resident, and care coordinated with the CTC and RN. I reviewed, edited and agree with the findings and plan in this note.     Eyal Hand M.D.   of Psychiatry     Interim History:   The patient's care was discussed with the treatment team and chart notes were reviewed.     Sleep: 6 hours  PRN's: none  Staff Notes: Patient told staff she was doing well and thinks court hearing went well; dismissed concerns of family  "regarding her mental health.  Pleasant with staff.  Continued to discuss talk of relationship with Yvette; during group she told peers that Alison Crawford gave her \"gifts, love, flowers.\"  She told staff \"strange circumstances\" were what led to her hospitalization; told staff that after her divorce, her family turned against her.  She also told staff that she will be leaving on Thursday, but again reiterated that she does not want to go back to [place] because her son has gotten into gangs.     Treatment Meeting:  Patient met with the treatment team in the conference room this AM.  As the meeting began she told the team that she is leaving Thursday; team discussed with her different scenarios that may occur on Thursday and that if court supports petition, then she will not be leaving on Thursday but instead remaining in the hospital under the obligation to work with the treatment team toward stabilization and appropriate discharge.  She indicated that she understood this.  Team asked about her group discussion of sandoval and gifts from Yvette; she denied ever getting gifts from [place] and said she only got a letter from him.  She said she told the group that she enjoys getting flowers as a gift, but denied saying Yvette gave her sandoval.  She again told the team that her family is lying about her.  She reported having only had short conversations with her family since last week, but that they weren't good.  When team discussed with her that her son denied gang activity, she became more agitated saying that he was in a gang because he was doing marijuana, \"coming home late or not at all,\" \"would get chased on his way home,\" and his phone bill was increased.  Team pointed out to her that those things do not mean he is in a gang, but Hilda continued to insist that he is.    Team asked her what her preferred plans would be if released from the petition for commitment by the court.  She said to leave the hospital and live " "peacefully without people intruding.  She would like to live in the , specifically [place], [place] because it is a \"unique, good environment, and very welcoming.\"  When asked if she liked that it was near her family, she did not respond.  Team asked her if she'd gotten into contact with an  as she'd previously discussed.  Patient had not, but endorsed plans to do so because she wants to stay in this country.  She again asked about being able to go outside, but team told her she could not until her legal status was determined.  Patient endorsed understanding.    Review of systems:     The Review of Systems is negative other than noted above.         Medications:     Current Facility-Administered Medications   Medication     haloperidol (HALDOL) tablet 2 mg    And     haloperidol (HALDOL) tablet 0.5 mg     benztropine (COGENTIN) tablet 1 mg     benztropine mesylate (COGENTIN) injection 1 mg     haloperidol lactate (HALDOL) injection 5 mg    Or     haloperidol (HALDOL) tablet 2 mg     acetaminophen (TYLENOL) tablet 650 mg          Allergies:   No Known Allergies         Psychiatric Examination:   /77  Pulse 78  Temp 98.1  F (36.7  C)  Resp 12  Ht 1.702 m (5' 7\")  Wt 78 kg (172 lb)  BMI 26.94 kg/m2  Weight is 172 lbs 0 oz  Body mass index is 26.94 kg/(m^2).    Appearance:  appeared as age stated, well groomed, wearing hijab and dress. Carries her glasses with her.  Attitude:  cooperative  Eye Contact:  fair  Mood:  Less anxious today; not becoming overly agitated when discussing Alison Crawford or family.  Affect:  intensity is blunted. Neutral. Mood congruent.   Speech:  clear, coherent, knows some English, regular rate and rhythm  Psychomotor Behavior:  no evidence of tardive dyskinesia, dystonia, or tics. Fidgets with glasses when anxious.  Thought Process:  Linear in her responses  Associations:  no loose associations  Thought Content:  Delusions present, non-bizarre.  No SI or auditory " hallucinations.  Insight:  very poor  Judgment:  poor  Oriented to:  time, person, and place  Attention Span and Concentration:  intact  Recent and Remote Memory:  limited  Language: limited English; uses Cypriot   Fund of Knowledge: appropriate to level of education  Muscle Strength and Tone: normal  Gait and Station: Normal         Labs:   Risperidone level <0.5  9-hydroxyrisperidone level: 2.4  Combined total risperidone and metabolite: 2.4  FSH 77.8    Recent Labs   Lab Test  05/15/17   0753   CHOL  207*   TRIG  64   LDL  123*   HDL  71     Recent Labs   Lab Test  05/15/17   0753   CR  0.65   GFRESTIMATED  >90  Non  GFR Calc     BUN  10   NA  144   POTASSIUM  3.9   FREDRICK  8.7   GLC  100*     Recent Labs   Lab Test  05/15/17   0753   AST  14   ALT  27   ALKPHOS  84   BILITOTAL  0.3   PROTTOTAL  7.7   ALBUMIN  3.5     Recent Labs   Lab Test  05/15/17   0753   WBC  8.5   HGB  13.5   PLT  196     Recent Labs   Lab Test  05/15/17   0753   TSH  1.09

## 2017-05-23 NOTE — PLAN OF CARE
Problem: General Plan of Care (Inpatient Behavioral)  Goal: Team Discussion  Team Plan:   BEHAVIORAL TEAM DISCUSSION     Continued Stay Criteria/Rationale: Commitment proceedings as she refuses to take medications.  Plan: Complete court process in order to start medications to further her care.   Participants: Mercedes Romero [facility], Latricia Vargas RN,  Dr. Eyal Hand MD, Dr. Yvan Werner MD,  Hugo Batista MS4, Dio Robertson OT  Summary/Recommendation: Patient continues to deny any mental health issues or need for medications as she believes her concerns for safety are justified.   Progress: No change

## 2017-05-23 NOTE — PROGRESS NOTES
Mostly pleasant and approachable. Appetite good. Attending some groups. Mood even.        05/23/17 1100   Behavioral Health   Thinking distractable;paranoid   Orientation situation, disoriented;person: oriented;place: oriented   Memory baseline memory   Insight poor;denial of illness   Judgement impaired   Affect full range affect   Mood anxious   Physical Appearance/Attire attire appropriate to age and situation;neat   Activity withdrawn   Speech pressured   Safety   Suicidality status 15

## 2017-05-24 ENCOUNTER — OFFICE VISIT (OUTPATIENT)
Dept: INTERPRETER SERVICES | Facility: CLINIC | Age: 47
End: 2017-05-24

## 2017-05-24 PROCEDURE — 97150 GROUP THERAPEUTIC PROCEDURES: CPT | Mod: GO

## 2017-05-24 PROCEDURE — 99231 SBSQ HOSP IP/OBS SF/LOW 25: CPT | Mod: GC | Performed by: PSYCHIATRY & NEUROLOGY

## 2017-05-24 PROCEDURE — 12400001 ZZH R&B MH UMMC

## 2017-05-24 PROCEDURE — 90853 GROUP PSYCHOTHERAPY: CPT

## 2017-05-24 PROCEDURE — T1013 SIGN LANG/ORAL INTERPRETER: HCPCS | Mod: U3

## 2017-05-24 ASSESSMENT — ACTIVITIES OF DAILY LIVING (ADL)
DRESS: INDEPENDENT
LAUNDRY: WITH SUPERVISION
ORAL_HYGIENE: INDEPENDENT
HYGIENE/GROOMING: INDEPENDENT

## 2017-05-24 NOTE — PROGRESS NOTES
The patient woke very early and could be heard praying loudly for several hours before coming out of her room and pacing in the loya for the remainder of the shift. She has a strong smell of body odder and declined to shower. In fact she appeared offended when approached and spoken to by staff regarding anything such as morning pleasantries.

## 2017-05-24 NOTE — PROGRESS NOTES
Observed to have slept for approx 5.5 hrs overnight.  Early morning awakening for prayers.  Continues to refuse to pray anywhere outside her immediate room thought offered an area of privacy and comfort.     Becomes argumentative and defensive w/o appropriate reasoning.  Can be overheard whispering/talking to self at times in room when wakeful though laughs and denies any hallucinations when questioned re: same.  Pacing the halls and is mostly pleasant on approach w/ limited engagement w/ staff/peers. Safe, therapeutic environment maintained.

## 2017-05-24 NOTE — PROGRESS NOTES
----------------------------------------------------------------------------------------------------------  [facility], [place]   Psychiatric Progress Note     Assessment    Presentation: Hilda Delgadillo is a 46 year old female with a history of unspecified chronic psychosis who presented from the [facility] after she became agitated and paranoid, refusing to get on the plane and saying her family manipulated her.  She takes risperidone, but has been off of it for 4 days according to her son.    Diagnostic Impression: Hilda Delgadillo is a 46 year old female with a history of unspecified chronic psychosis who was brought in by ambulance by family request as she became more agitated and paranoid while at the [facility] and refused to board the plane to return to her home in [place].  Her MSE is notable for agitation with poor insight and paranoia.  She denies substance abuse.  She was diagnosed with unspecified chronic psychosis in 2015.  She takes risperidone, which family says she takes disguised in drinks and food, but stopped taking her risperidone 4 days ago and told staff that she does not take any medications.   History and presentation of symptoms are consistent with diagnosis of chronic psychosis, likely late onset paranoid schizophrenia during menopausal transition, with decompensation due to cessation of anti-psychotics.    Hospital course: Hilda Delgadillo was admitted to station 22 on a 72 hour hold and placed on status 15.  She refused to take risperidone because she denied ever being on risperidone or having mental health issues.  The risperidone prescription bottle was listed for a name similar to Hilda's and the prescription came from [facility], which was also listed under under a different name.  However, family verified [facility] hospitalization, and this patient fit the same description as Hilda for presentation of symptoms, paranoia, and references to  Yvette.   At [facility], she referred to [facility] as her primary support system.  [place] tried to commit her, but it was not supported by the [facility].  Due to second hospitalization within two months, not having a home to return to, no insight, and refusing to take medications, commitment with clement was filed for her.  She was switched over from risperidone 1 mg PO to haloperidol 2.5 mg qHS with plans to eventually switch her to IM haldol decanoate (CASTELLON) but she refused to take any medications.  Family came to visit her and she was very argumentative and agitated during their visit; she continued to deny the need for medication despite persistent delusions during hospitalization.  She intermittently had more aggressive behavior with staff and other peers, and did not respond to redirection.  On 5/17 team was informed that [facility] had supported petition for committment and patient had been placed on a court hold.  Patient had problems with her roommate last week, as well as a new roommate over the weekend; would intermittently not respond to redirection or would become agitated and question redirection. She was given a single room. She reported at times a special relationship with Alison Crawford, e.g., he had sent her a personal letter telling her she could stay in the [place], or sent her flowers; at other times, she denied saying or believing these ideas.  Had her first court hearing Monday, 5/22 with second hearing scheduled for Thursday 5/25.    Medical course: None.    Plan     Principal Diagnosis: Psychosis NEC vs Schizophrenia vs Bipolar currently manic with psychotic features    Medications:  (Patient has refused to take any medications as of 5/24, Hospital day #9)  -haloperidol 2.5 mg PO qHS  -haloperidol 2mg PO q2h PRN for agitation or haloperidol 5mg IM q2h PRN for agitation  -benztropine 1mg PO BID PRN for EPSE   -benztropine 1mg IM daily  for acute dystonic reaction    Laboratory/Imaging: On admission: UTox  negative, CBC, TSH and CMP WNL   -elevated cholesterol 207 and .   -elevated FSH cw menopausal status (LMP 2 years ago)   -risperidone level <0.5; metabolite 2.4  Consults: none  Patient will be treated in therapeutic milieu with appropriate individual and group therapies as described.    Medical diagnoses to be addressed this admission:  None.    Relevant psychosocial stressors: chronic mental illness, poor medication compliance    Legal Status: Court Hold    Safety Assessment:   Checks: Status 15  Precautions: Suicide, SIB, single room  Pt has not required locked seclusion or restraints in the past 24 hours to maintain safety, please refer to RN documentation for further details.    The risks, benefits, alternatives and side effects have been discussed and are understood by the patient and other caregivers.    Anticipated Disposition/Discharge Date: pending medication adjustment and mental stabilization - court hearing 5/24    --------------------------------------------------    Scribed by Hugo Batista, MS4, for Dr. Yvan Werner, resident.  Hugo Batista, MS4  [phone]    I, Yvan Werner DO, have reviewed and edited the documentation recorded by the scribe.  This documentation accurately reflects the services I personally performed and treatment decisions made by me in consultation with the attending physician.    Yvan Werner DO  Resident Psychiatrist, PGY-1  Pager: [phone]    Psychiatry Attending Attestation:  This patient was not seen by me today because the  did not arrive on time.  The patient's condition and treatment plan were discussed with the resident, and care coordinated with the CTC and RN. I reviewed, edited and agree with the findings and plan in this note.     Eyal Hand M.D.   of Psychiatry     Interim History:   The patient's care was discussed with the treatment team and chart notes were reviewed.     Sleep: 5.75 hours  PRN's: none  Staff  Notes: Patient was mostly pleasant and approachable. She was visible in milieu, selectively social with peers.  Denied all mental health symptoms.  Awoke early for prayer and could be heard loudly praying - when asked to keep her voice down, she became argumentative.  Can be heard whispering and talking to self at times, but laughs and denies hallucinations when asked.  Smells strongly of body odor, but declined to shower and became offended when approached by staff.     Treatment Meeting:  Patient met with team in the conference room this AM with the aid of an interpretor. She was very chatty with interpretor while walking towards meeting place but became less outgoing once seated in the conference room.  Team discussed prayer with her; emphasized that team understands and respects her prayer time, but encouraged her to also be mindful of the volume because it does disturb others when sleeping.  Patient was agreeable, though she denied anyone ever approaching her about her noise level being too loud.  Team discussed showering with her; she does not remember anyone asking her to shower.  She has plans to shower today. Team asked her if she had any worries or concerns about showering, but she denied this.  Plans for court hearing tomorrow were reviewed and the team briefly recapitulated some of our concerns regarding her psychiatric status.  She had no question for the group and was provided with positive feedback on being willing to sit and calmly speak with us.    Review of systems:     The Review of Systems is negative other than noted above.         Medications:     Current Facility-Administered Medications   Medication     haloperidol (HALDOL) tablet 2 mg    And     haloperidol (HALDOL) tablet 0.5 mg     benztropine (COGENTIN) tablet 1 mg     benztropine mesylate (COGENTIN) injection 1 mg     haloperidol lactate (HALDOL) injection 5 mg    Or     haloperidol (HALDOL) tablet 2 mg     acetaminophen (TYLENOL) tablet  "650 mg          Allergies:   No Known Allergies         Psychiatric Examination:   /77  Pulse 78  Temp 96  F (35.6  C)  Resp 14  Ht 1.702 m (5' 7\")  Wt 78 kg (172 lb)  BMI 26.94 kg/m2  Weight is 172 lbs 0 oz  Body mass index is 26.94 kg/(m^2).    Appearance:  appeared as age stated, well groomed, wearing hijab and dress. Body odor noted by staff.  Attitude:  cooperative  Eye Contact:  fair  Mood:  Less anxious today, she appears calmer, says she is \"doing fine\"  Affect:  intensity is blunted. Neutral. Mood congruent.   Speech:  clear, coherent, knows minimal English, regular rate and rhythm  Psychomotor Behavior:  no evidence of tardive dyskinesia, dystonia, or tics. Fidgets with glasses when anxious.  Thought Process:  Linear in her responses  Associations:  no loose associations  Thought Content:  Delusions present, non-bizarre.  No SI but was noted to be responding to internal stimuli.  Insight:  very poor  Judgment:  poor  Oriented to:  time, person, and place  Attention Span and Concentration:  intact  Recent and Remote Memory:  limited  Language: limited English; uses Written   Fund of Knowledge: appropriate to level of education  Muscle Strength and Tone: normal  Gait and Station: Normal         Labs:   Risperidone level <0.5  9-hydroxyrisperidone level: 2.4  Combined total risperidone and metabolite: 2.4  FSH 77.8    Recent Labs   Lab Test  05/15/17   0753   CHOL  207*   TRIG  64   LDL  123*   HDL  71     Recent Labs   Lab Test  05/15/17   0753   CR  0.65   GFRESTIMATED  >90  Non  GFR Calc     BUN  10   NA  144   POTASSIUM  3.9   FREDRICK  8.7   GLC  100*     Recent Labs   Lab Test  05/15/17   0753   AST  14   ALT  27   ALKPHOS  84   BILITOTAL  0.3   PROTTOTAL  7.7   ALBUMIN  3.5     Recent Labs   Lab Test  05/15/17   0753   WBC  8.5   HGB  13.5   PLT  196     Recent Labs   Lab Test  05/15/17   0753   TSH  1.09     "

## 2017-05-24 NOTE — PROGRESS NOTES
More visible and social. Appetite good. Attending groups. Mostly pleasant. Intrusive at times with other patients. Redirectable.         05/24/17 1200   Behavioral Health   Hallucinations denies / not responding to hallucinations   Thinking (Improved)   Orientation person: oriented;place: oriented;situation, disoriented   Memory baseline memory   Insight poor   Judgement impaired   Affect full range affect   Mood mood is calm   Physical Appearance/Attire attire appropriate to age and situation;neat   Activity (More active, visible and social)   Speech pressured   Safety   Suicidality status 15

## 2017-05-24 NOTE — PROGRESS NOTES
Patient was visible in milieu, watching tv, selectively social with peers. Affect was full range. Mood was calm. Pt denies all mental health symptoms, stated that she feels good and is sleeping well. No SI, hallucinations, med side effects noted.      05/23/17 1917   Behavioral Health   Hallucinations denies / not responding to hallucinations   Thinking distractable;poor concentration   Orientation person: oriented;place: oriented   Memory baseline memory   Insight denial of illness;poor   Judgement impaired   Eye Contact cultural specific   Affect full range affect   Mood mood is calm   Physical Appearance/Attire attire appropriate to age and situation   Hygiene other (see comment)  (adequate)   Suicidality (denies)   Self Injury (denies)   Activity withdrawn   Speech pressured   Medication Sensitivity no stated side effects   Psychomotor / Gait balanced;steady   Psycho Education   Type of Intervention 1:1 intervention   Response participates with encouragement   Hours 0.5   Treatment Detail check in   Activities of Daily Living   Hygiene/Grooming independent   Oral Hygiene independent   Dress independent   Room Organization independent

## 2017-05-24 NOTE — PLAN OF CARE
"Problem: Psychotic Symptoms  Goal: Psychotic Symptoms  Signs and symptoms of listed problems will be absent or manageable.  -pt will verbalize coping skills to manage hallucinations  -pt will participate in groups demonstrating adequate attention span  -pt will identify when or if they are having any medication side effects  -pt will be able to have reality based conversations   Outcome: No Change     Met with patient with . Introduced self, asked if patient had any questions or concerns. Through , asks \"what kind of questions should I ask?\" Replied, anything you have a question about. States she has no questions. States she is sleeping well. Denies any symptoms. Denies hallucinations, but appears preoccupied and suspicious at times. States she has no concerns. Answers these questions without assistance from . Patient is hyperverbal in Somalian, mainly asking the  personal questions such as \"Where are you from?\" Attempted to redirect and explain  is only here to translate and facilitate communication between her and myself. She continues to speak Somalian and has an animated conversation with the  for a brief period.  states he cannot understand her and maybe she is \"speaking a different language\". Asked again if she had any further questions or concerns. States she does not. Encouraged to notify staff of needs, questions, and concerns.       Addendum:     As the evening wore on, pt appears hallucinating with loudly talking to self, and having animated conversations. Continues to refuse medications and denies hallucinations.   "

## 2017-05-25 PROCEDURE — 12400001 ZZH R&B MH UMMC

## 2017-05-25 PROCEDURE — 97150 GROUP THERAPEUTIC PROCEDURES: CPT | Mod: GO

## 2017-05-25 ASSESSMENT — ACTIVITIES OF DAILY LIVING (ADL)
DRESS: INDEPENDENT;STREET CLOTHES
ORAL_HYGIENE: INDEPENDENT
LAUNDRY: WITH SUPERVISION
DRESS: INDEPENDENT;STREET CLOTHES
ORAL_HYGIENE: INDEPENDENT
HYGIENE/GROOMING: INDEPENDENT
LAUNDRY: WITH SUPERVISION
GROOMING: INDEPENDENT

## 2017-05-25 NOTE — PROGRESS NOTES
"Hilda  attended 1 of 2 OT groups today. Rather than select a song to share with the group, she elected to watch a 20-minute speech by Alison Crawford. Worked with decent focus on a chosen creative project. At one point became argumentative with a female patient; their remarks were in Somalian and so this writer was unable to understand the content. The other patient reported that Hilda had threatened to \"rip her throat out.\"      "

## 2017-05-26 ENCOUNTER — OFFICE VISIT (OUTPATIENT)
Dept: INTERPRETER SERVICES | Facility: CLINIC | Age: 47
End: 2017-05-26

## 2017-05-26 PROCEDURE — 12400001 ZZH R&B MH UMMC

## 2017-05-26 PROCEDURE — 99232 SBSQ HOSP IP/OBS MODERATE 35: CPT | Mod: GC | Performed by: PSYCHIATRY & NEUROLOGY

## 2017-05-26 PROCEDURE — T1013 SIGN LANG/ORAL INTERPRETER: HCPCS | Mod: U3

## 2017-05-26 PROCEDURE — 90853 GROUP PSYCHOTHERAPY: CPT

## 2017-05-26 PROCEDURE — 25000132 ZZH RX MED GY IP 250 OP 250 PS 637: Performed by: STUDENT IN AN ORGANIZED HEALTH CARE EDUCATION/TRAINING PROGRAM

## 2017-05-26 PROCEDURE — 97150 GROUP THERAPEUTIC PROCEDURES: CPT | Mod: GO

## 2017-05-26 RX ORDER — BENZTROPINE MESYLATE 1 MG/1
1 TABLET ORAL 2 TIMES DAILY PRN
Status: DISCONTINUED | OUTPATIENT
Start: 2017-05-26 | End: 2017-06-08 | Stop reason: HOSPADM

## 2017-05-26 RX ORDER — HALOPERIDOL 5 MG/ML
5 INJECTION INTRAMUSCULAR AT BEDTIME
Status: DISCONTINUED | OUTPATIENT
Start: 2017-05-26 | End: 2017-05-30

## 2017-05-26 RX ORDER — BENZTROPINE MESYLATE 1 MG/ML
1 INJECTION, SOLUTION INTRAMUSCULAR; INTRAVENOUS 2 TIMES DAILY PRN
Status: DISCONTINUED | OUTPATIENT
Start: 2017-05-26 | End: 2017-06-08 | Stop reason: HOSPADM

## 2017-05-26 RX ADMIN — HALOPERIDOL 2.5 MG: 2 TABLET ORAL at 22:42

## 2017-05-26 ASSESSMENT — ACTIVITIES OF DAILY LIVING (ADL)
LAUNDRY: WITH SUPERVISION
ORAL_HYGIENE: INDEPENDENT
DRESS: STREET CLOTHES
ORAL_HYGIENE: INDEPENDENT
DRESS: INDEPENDENT
GROOMING: INDEPENDENT
LAUNDRY: WITH SUPERVISION
GROOMING: INDEPENDENT

## 2017-05-26 NOTE — PROGRESS NOTES
Behavioral Health  Note   Behavioral Health  Spirituality Group Note     Unit 22    Name: Hilda Delgadillo    YOB: 1970   MRN: [id number]    Age: 46 year old     Patient attended -led group, which included discussion of spirituality, coping with illness and building resilience.   Patient attended group for 0.5 hrs.   patient minimally participated, but was respectful to the group process.     Christine [facility]  Staff    Page [phone]

## 2017-05-26 NOTE — PROGRESS NOTES
SPIRITUAL HEALTH SERVICES  SPIRITUAL ASSESSMENT Progress Note  [facility] ([facility]) St[address]22   ON-CALL VISIT    Consult order placed for patient asking for prayer/ritual support.  I checked with unit staff and noted that Hilda has had support from the Christian  who will be in tomorrow. Staff agreed it would be fine for a visit tomorrow.    I will inform the unit  of care provided and will note for the Imam's visit tomorrow.    Adriana Taylor MDiv  Chaplain Resident  Pager [phone]

## 2017-05-26 NOTE — PROGRESS NOTES
Pt was visible in the milieu, and watched tv. Pt was visited by her son and his cousin, the visit was tense and the pt was arguing with her son. She said that her son is making bad decisions and is not good. Pt dismissed the cousin asking him to leave because he is on her husbands side of the family. Pt denies SI/SIB, hallucinations, and medication side affects. She appears to be responding while in the milieu and in her room.      05/25/17 1900   Behavioral Health   Hallucinations appears responding   Thinking poor concentration;delusional;distractable   Orientation place: oriented;person: oriented   Memory baseline memory   Insight poor   Judgement impaired   Eye Contact at examiner   Affect irritable;tense   Mood labile;irritable   Physical Appearance/Attire attire appropriate to age and situation   Hygiene well groomed   Suicidality other (see comments)  (denies )   Self Injury other (see comment)  (denies )   Activity other (see comment)  (visible in the milieu )   Speech clear;coherent   Medication Sensitivity no stated side effects;no observed side effects   Psychomotor / Gait steady;balanced   Psycho Education   Type of Intervention 1:1 intervention   Response participates, initiates socially appropriate   Hours 0.5   Treatment Detail check in    Group Therapy Session   Group Attendance attended group session   Group Type community   Activities of Daily Living   Hygiene/Grooming independent   Oral Hygiene independent   Dress independent;street clothes   Laundry with supervision   Room Organization independent   Activity   Activity Level of Assistance independent

## 2017-05-26 NOTE — PLAN OF CARE
"Problem: Psychotic Symptoms  Goal: Psychotic Symptoms  Signs and symptoms of listed problems will be absent or manageable.  -pt will verbalize coping skills to manage hallucinations  -pt will participate in groups demonstrating adequate attention span  -pt will identify when or if they are having any medication side effects  -pt will be able to have reality based conversations   Hilda, with , explained that her  () is the father of her 6 children.  She said that he is an evil man, not responsible and violent.  She believes that he is influencing her relatives to have her put in mental health hospital because her former  believe \"I am mentally ill.\".  She believes that he is still dangerous to her because of the influence that he has over her relatives.  She believes that her relatives giving a bad report about her is what got her into the hospital.  She was oriented, fluent in conversation, denied hallucinations and thought her thinking was clear.  \"I don't need medication.\".  She does not take medication because she believes she is here for safety not for medication.  Upon discharge here she hopes that, \"either [place] or  government will give her safe haven, if not, she wants to go to the [place].  She described two incidents when her family called an ambulance to take her to the hospital. Once was when she was upstairs doing something and (her sister?) a family member called for an ambulance.  The other time was more recently at the airport when here sister called security saying that Hilda \"was mentally ill.\".  She knows that the court will decide in near future her hospital course here and stated she went to court yesterday.  She had great eye contact, an elegant way of speaking with a soft, calm voice and articulate. Her sister Syd, called for update, marian signed and in chart.  She (sister) was distressed that Hilda was still so fixed on the above narrative.   " Paper work for commitment and neuroleptic medication mandate (see chart) in chart timed around 0700 and May 26, 2017.  Forwarded sister's call to  phone.

## 2017-05-26 NOTE — PROGRESS NOTES
----------------------------------------------------------------------------------------------------------  [facility], [place]   Psychiatric Progress Note     Assessment    Presentation: Hilda Delgadillo is a 46 year old female with a history of unspecified chronic psychosis who presented from the [facility] after she became agitated and paranoid, refusing to get on the plane and saying her family manipulated her.  She takes risperidone, but has been off of it for 4 days according to her son.    Diagnostic Impression: Hilda Delgadillo is a 46 year old female with a history of unspecified chronic psychosis who was brought in by ambulance by family request as she became more agitated and paranoid while at the [facility] and refused to board the plane to return to her home in [place].  Her MSE is notable for agitation with poor insight and paranoia.  She denies substance abuse.  She was diagnosed with unspecified chronic psychosis in 2015.  She takes risperidone, which family says she takes disguised in drinks and food, but stopped taking her risperidone 4 days ago and told staff that she does not take any medications.   History and presentation of symptoms are consistent with diagnosis of chronic psychosis, likely late onset paranoid schizophrenia during menopausal transition, with decompensation due to cessation of anti-psychotics.    Hospital course: Hilda Delgadillo was admitted to station 22 on a 72 hour hold and placed on status 15.  She refused to take risperidone because she denied ever being on risperidone or having mental health issues.  The risperidone prescription bottle was listed for a name similar to Hilda's and the prescription came from [place], which was also listed under under a different name.  However, family verified [place] hospitalization, and this patient fit the same description as Hilda for presentation of symptoms, paranoia, and references to  Yvette.   At [facility], she referred to [facility] as her primary support system.  [place] tried to commit her, but it was not supported by the [facility].  Due to second hospitalization within two months, not having a home to return to, no insight, and refusing to take medications, commitment with clement was filed for her.  She was switched over from risperidone 1 mg PO to haloperidol 2.5 mg qHS with plans to eventually switch her to IM haldol decanoate (CASTELLON) but she refused to take any medications.  Family came to visit her and she was very argumentative and agitated during their visit; she continued to deny the need for medication despite persistent delusions during hospitalization.  She intermittently had more aggressive behavior with staff and other peers, and did not respond to redirection.  On 5/17 team was informed that [facility] had supported petition for committment and patient had been placed on a court hold.  Patient had problems with her roommate last week, as well as a new roommate over the weekend; would intermittently not respond to redirection or would become agitated and question redirection. She was given a single room. She reported at times a special relationship with Alison Crawford, e.g., he had sent her a personal letter telling her she could stay in the [place], or sent her flowers; at other times, she denied saying or believing these ideas.  Had her first court hearing Monday, 5/22 with second hearing scheduled for Thursday 5/25.  On 5/26, she was committed and jarvised; she was started on haldol 2.5 mg PO qHS or haldol 5 mg IM qHS if she refused PO.    Medical course: None.    Plan     Principal Diagnosis: Psychosis NEC vs Schizophrenia vs Bipolar currently manic with psychotic features    Medications:   -haloperidol 2.5 mg PO qHS or haloperidol 5 mg IM qHS  -haloperidol 2 mg PO q2h PRN for agitation or haloperidol 5 mg IM q2h PRN for agitation  -benztropine 1 mg PO BID PRN for EPSE   -benztropine 1 mg IM  daily for acute dystonic reaction    Laboratory/Imaging: On admission: UTox negative, CBC, TSH and CMP WNL   -elevated cholesterol 207 and .   -elevated FSH cw menopausal status (LMP 2 years ago)   -risperidone level <0.5; metabolite 2.4  Consults: none  Patient will be treated in therapeutic milieu with appropriate individual and group therapies as described.    Medical diagnoses to be addressed this admission:  None.    Relevant psychosocial stressors: chronic mental illness, poor medication compliance    Legal Status: Commitment with [employer]    Safety Assessment:   Checks: Status 15  Precautions: Suicide, SIB, single room  Pt has not required locked seclusion or restraints in the past 24 hours to maintain safety, please refer to RN documentation for further details.    The risks, benefits, alternatives and side effects have been discussed and are understood by the patient and other caregivers.    Anticipated Disposition/Discharge Date: pending medication adjustment and mental stabilization - court hearing 5/24    --------------------------------------------------    Scribed by Hugo Batista, MS4, for Dr. Yvan Werner, resident.  Hugo Batista, MS4  [phone]    I, Yvan Werner DO, have reviewed and edited the documentation recorded by the scribe.  This documentation accurately reflects the services I personally performed and treatment decisions made by me in consultation with the attending physician.    Yvan Werner DO  Resident Psychiatrist, PGY-1  Pager: [phone]    Psychiatry Attending Attestation:  This patient has been seen and evaluated by me, Eyal Hand M.D.  The patient's condition and treatment plan were discussed with the resident, and care coordinated with the CTC and RN. I reviewed, edited and agree with the findings and plan in this note.     Eyal Hand M.D.   of Psychiatry     Interim History:   The patient's care was discussed with the treatment team and  "chart notes were reviewed.     Sleep: 5.25 hours  PRN's: none  Staff Notes: Patient went to court yesterday.  After court, she was seen talking to self at times.  Intrusive with peers, angry rambling at times.  Numerous derogatory comments per peers.  Ate meals, well groomed. During OT, rather than selecting a song to share with the group, she selected a 20 minute speech by Alison Crawford.  At one point became argumentative with another female peer; they were speaking in Citizen of the [place] and other peer reported that Hilda threatened to \"rip her throat out.\" Later on patient was visible in milieu and watched tv.  She was visited by son and his cousin; visit was tense and she was arguing  With son.  Told staff that her son is making bad decisions; dismissed the cousin and asked him to leave because he is on 's side of family.  Patient was distractable, had poor concentration, and irritable, though she denies hallucinations. Continues to refuse medications.     Treatment Meeting:  Hilda told the team she was not doing well today after court; she said that court \"went badly.\"  She was very confused that her  wanted her to sign something and agree to take medication, but she was confused and she asked for an interpretor.  She did not sign the paperwork and decided to continue with court.  Team asked her about how she was after court and what she did; she said group projects helped her feel better.  Team discussed interactions with a female, Citizen [place] peer.  She denied having any problems with her despite staff reports of the two of them arguing; team advised her to not interact with this female peer to prevent problems.  She continued to deny any prior arguments with female peer; she said the peer talks a lot, and that was the only problem.  Team again advised her to respect everyone's space and to avoid engaging with this peer other than polite small talk to prevent further arguments.      Team asked her about visit " "with son and his cousin.  She denies that this is her son's cousin; says she \"does not know this boy.\"  She said the boy is \"not good.\"  She discussed how her son was living with her sister in [place], but moved up to the [facility] to stay with her ex-'s sister.  She confirmed, when prompted, that the cousin is the son of Hilda's son's paternal aunt.  She does not like him living in the [facility] because there are more bad people here and she worries about him hanging out with \"bad kids in a big city.\"  She doesn't believe him about his living situation with his paternal aunt; team discussed how it sounds like a plausible living situation especially since he visited with his cousin.  Patient did not respond to this.   She then discussed being concerned about the cousin and when asked what concerned her, she did not elaborate, despite prompting, other than saying \"he's not good people.\"    Team asked her about delusions and hallucinations, which she denied.  Team asked her to define delusions and she said \"someone confused.\" Team clarified the definition of delusion, discussed how there can be changes in the brain causing this, and gave an example. She continued to deny delusions.  She said her family has said she was \"delusional\" previously and she did not understand why they were saying that.  An example of her delusions concerning Yvette were again brought up.  She denied previously saying that Alison Crawford has called her, receiving personal letters from him, saying she wanted to  him, or saying she loved him; however, she previously made these statements during team meetings and to staff on multiple days since her hospitalization.  She discussed how love does not change the brain and how she can love Obama, like she loves nature and birds, but it isn't the same as other kinds of love.  She continued to discuss types of love with interpretor at end of meeting.    Later in the day, at approximately 3:30 PM " "team received the decision of the court to commit the patient with [facility].  A team member accessed a telephone interpretor and explained the patient's legal status to her repeatedly, this being necessary due to her being argumentative and resistant, especially regarding the use of medications.  It was explained to her that due to the [facility] order, she had the option to take oral medications, but that if she declined, she would be given IM medication.  She stated that she would refuse to take any medications.    Review of systems:     The Review of Systems is negative other than noted above.         Medications:     Current Facility-Administered Medications   Medication     haloperidol (HALDOL) tablet 2.5 mg    Or     haloperidol lactate (HALDOL) injection 5 mg     benztropine (COGENTIN) tablet 1 mg     benztropine mesylate (COGENTIN) injection 1 mg     haloperidol lactate (HALDOL) injection 5 mg    Or     haloperidol (HALDOL) tablet 2 mg     acetaminophen (TYLENOL) tablet 650 mg          Allergies:   No Known Allergies         Psychiatric Examination:   /77  Pulse 78  Temp 97.4  F (36.3  C)  Resp 16  Ht 1.702 m (5' 7\")  Wt 78.9 kg (174 lb)  BMI 27.25 kg/m2  Weight is 174 lbs 0 oz  Body mass index is 27.25 kg/(m^2).    Appearance:  appeared as age stated, well groomed, wearing hijab and dress.   Attitude:  cooperative  Eye Contact:  fair  Mood:  Sad, but doing fine.  Sad and confused from court process.  Affect:  intensity is blunted. Neutral. Mood congruent.   Speech:  clear, coherent, knows minimal English, regular rate and rhythm  Psychomotor Behavior:  no evidence of tardive dyskinesia, dystonia, or tics. Fidgets with glasses when anxious.  Thought Process:  Linear in her responses. Can present reasonable thought process and explanations, but her responses not consistent with staff or peer reports of her behavior   Associations:  no loose associations  Thought Content:  Delusions present, non-bizarre " but frankly implausible/impossible.  No SI but was noted to be responding to internal stimuli.  Insight:  very poor  Judgment:  poor  Oriented to:  time, person, and place  Attention Span and Concentration:  intact  Recent and Remote Memory:  limited  Language: limited English; uses Portuguese   Fund of Knowledge: appropriate to level of education  Muscle Strength and Tone: normal  Gait and Station: Normal         Labs:   Risperidone level <0.5  9-hydroxyrisperidone level: 2.4  Combined total risperidone and metabolite: 2.4  FSH 77.8    Recent Labs   Lab Test  05/15/17   0753   CHOL  207*   TRIG  64   LDL  123*   HDL  71     Recent Labs   Lab Test  05/15/17   0753   CR  0.65   GFRESTIMATED  >90  Non  GFR Calc     BUN  10   NA  144   POTASSIUM  3.9   FREDRICK  8.7   GLC  100*     Recent Labs   Lab Test  05/15/17   0753   AST  14   ALT  27   ALKPHOS  84   BILITOTAL  0.3   PROTTOTAL  7.7   ALBUMIN  3.5     Recent Labs   Lab Test  05/15/17   0753   WBC  8.5   HGB  13.5   PLT  196     Recent Labs   Lab Test  05/15/17   0753   TSH  1.09

## 2017-05-27 ENCOUNTER — OFFICE VISIT (OUTPATIENT)
Dept: INTERPRETER SERVICES | Facility: CLINIC | Age: 47
End: 2017-05-27

## 2017-05-27 PROCEDURE — 12400001 ZZH R&B MH UMMC

## 2017-05-27 PROCEDURE — 25000132 ZZH RX MED GY IP 250 OP 250 PS 637: Performed by: STUDENT IN AN ORGANIZED HEALTH CARE EDUCATION/TRAINING PROGRAM

## 2017-05-27 PROCEDURE — T1013 SIGN LANG/ORAL INTERPRETER: HCPCS | Mod: U3

## 2017-05-27 RX ADMIN — HALOPERIDOL 2.5 MG: 2 TABLET ORAL at 21:31

## 2017-05-27 ASSESSMENT — ACTIVITIES OF DAILY LIVING (ADL)
DRESS: STREET CLOTHES
GROOMING: INDEPENDENT
ORAL_HYGIENE: INDEPENDENT
ORAL_HYGIENE: INDEPENDENT
DRESS: INDEPENDENT
LAUNDRY: UNABLE TO COMPLETE
GROOMING: INDEPENDENT

## 2017-05-27 NOTE — PROGRESS NOTES
Patient was visible in milieu watching tv, somewhat social with peers. Pt had to be redirected away from standing too close to a female peer several times at beginning of shift. Patient had a tense visit with family members. Affect was full range, tense, irritable. Mood was labile. Pt was insistent that she did not have to take her medication although she is jarvised so a code green was called. During the code she agreed to take the oral haldol rather than the IM. No SI, med side effects, or hallucinations noted.      05/26/17 2222   Behavioral Health   Hallucinations denies / not responding to hallucinations   Thinking distractable;delusional   Orientation person: oriented;place: oriented   Memory confabulation   Insight poor   Judgement impaired   Eye Contact at examiner   Affect tense;full range affect;irritable   Mood labile   Physical Appearance/Attire attire appropriate to age and situation   Hygiene well groomed   Suicidality (denies)   Self Injury (denies)   Activity restless   Speech pressured   Medication Sensitivity no observed side effects   Psychomotor / Gait balanced;steady   Activities of Daily Living   Hygiene/Grooming independent   Oral Hygiene independent   Dress independent   Laundry with supervision   Room Organization independent

## 2017-05-27 NOTE — PROGRESS NOTES
05/27/17 1518   Visit Information   Visit Made By Staff    Type of Visit On-call   Visited Patient   Interventions   Basic Spiritual Interventions   Assessment of spiritual needs/resources;Reflective conversation;Life Review;Prayer   Provision of Spiritual Resources  Other   SPIRITUAL HEALTH SERVICES Progress Note  [facility] ( [facility] [id number]         DATA:    Visited with pt on the basis of triaged for spiritual support of the pt. Reflected with pt around her hospital experience, sources of spiritual and emotional support and current spiritual health needs. Pt talked about her current situation and what it means for her. During my conversation with her I let her know that I could be support of her.       INTERVENTION:    Emotional support. Reflective conversation integrating illness elements and family spiritual narratives. I encouraged her to follow the doctor s advised. Also, I encouraged her to reconnect to her family. Gave Islamic prayer calendar time       OUTCOME:      Pt received spiritual support and reflective conversation in the context of this hospitalization. The pt expressed appreciation for the visit and the encouragement that she felt.       PLAN:    Will continue to provide support to pt/family during their hospitalization at least 1x/wk.                                                                                                                                             Carlos Hernandez  Staff   Pager [phone]

## 2017-05-27 NOTE — PROGRESS NOTES
Pt. Continues to believe she has a personal relationship with former President Yvette. She denies any mental health concerns and expressed that she is unsure why she is in the hospital. Pt. Was social and engaged with others, visible in the milieu.        05/27/17 1420   Behavioral Health   Hallucinations denies / not responding to hallucinations   Thinking delusional   Orientation situation, disoriented;person: oriented;place: oriented   Memory confabulation   Insight denial of illness   Judgement impaired   Eye Contact at examiner   Affect full range affect   Mood mood is calm   Physical Appearance/Attire attire appropriate to age and situation   Hygiene well groomed   Suicidality other (see comments)  (denies)   Self Injury other (see comment)  (denies)   Activity other (see comment)  (Present in the milieu, social and engaged with others. )   Speech pressured;coherent;other (see comments)  (Pt. utilizes an . )   Medication Sensitivity no stated side effects;no observed side effects   Psychomotor / Gait balanced;steady   Coping/Psychosocial   Verbalized Emotional State disbelief;frustration;powerlessness   Psycho Education   Type of Intervention 1:1 intervention   Response participates, initiates socially appropriate   Hours 0.5   Treatment Detail 1:1 check-in   Activities of Daily Living   Hygiene/Grooming independent   Oral Hygiene independent   Dress street clothes   Laundry unable to complete   Room Organization independent   Behavioral Health Interventions   Psychotic Symptoms maintain safety precautions;maintain safe secure environment;reality orientation;redirection of intrusive behaviors;encourage participation / independence with adls;provide emotional support;establish therapeutic relationship;assist with developing & utilizing healthy coping strategies;build upon strengths;assess patient response to medication;assess medication adherance;monitor need for prn medication;monitor confusion,  memory loss, decision making ability and reorient / intervent as needed   Social and Therapeutic Interventions (Psychotic Symptoms) encourage socialization with peers;encourage effective boundaries with peers;encourage participation in therapeutic groups and milieu activities

## 2017-05-28 PROCEDURE — 25000132 ZZH RX MED GY IP 250 OP 250 PS 637: Performed by: STUDENT IN AN ORGANIZED HEALTH CARE EDUCATION/TRAINING PROGRAM

## 2017-05-28 PROCEDURE — 12400001 ZZH R&B MH UMMC

## 2017-05-28 PROCEDURE — 90853 GROUP PSYCHOTHERAPY: CPT

## 2017-05-28 PROCEDURE — T1013 SIGN LANG/ORAL INTERPRETER: HCPCS | Mod: U3

## 2017-05-28 RX ADMIN — HALOPERIDOL 2.5 MG: 2 TABLET ORAL at 21:06

## 2017-05-28 ASSESSMENT — ACTIVITIES OF DAILY LIVING (ADL)
DRESS: INDEPENDENT
GROOMING: INDEPENDENT
LAUNDRY: WITH SUPERVISION
GROOMING: INDEPENDENT
DRESS: INDEPENDENT
ORAL_HYGIENE: INDEPENDENT
ORAL_HYGIENE: INDEPENDENT

## 2017-05-28 NOTE — PLAN OF CARE
Problem: Psychotic Symptoms  Goal: Psychotic Symptoms  Signs and symptoms of listed problems will be absent or manageable.  -pt will verbalize coping skills to manage hallucinations  -pt will participate in groups demonstrating adequate attention span  -pt will identify when or if they are having any medication side effects  -pt will be able to have reality based conversations   Outcome: No Change  Hilda spending time in [place] reading paper and watching movie-less intrusive with peer-continues to deny illness-denies any side effects from Haldol-pleasant in interactions with staff-continues angry with family-has letter in her room with presidential lyric and signed by Alison Crawford asking her to meet him in [place]-sister confirmed family sent letter hoping she would then get on plane to return home, but instead Hilda sent another letter explaining she needs help to stay in [place] and she is waiting response-per sister, family has sent several letters signed by Yvetet, one explaining he is  and so it would not be right for him to have a relationship with her

## 2017-05-28 NOTE — PROGRESS NOTES
Pt was visible in milieu, watching tv, social with peers. Affect was full range, tense at times. Pt ate dinner tray late due to [place] observance of Ramadan. Mood was calm. No SI, med side effects or hallucinations noted.      05/27/17 2056   Behavioral Health   Hallucinations denies / not responding to hallucinations   Thinking distractable;delusional   Orientation person: oriented;place: oriented   Memory confabulation   Insight poor   Judgement impaired   Eye Contact at examiner   Affect tense;full range affect   Mood mood is calm   Physical Appearance/Attire attire appropriate to age and situation   Hygiene well groomed   Suicidality (denies)   Self Injury (denies)   Activity restless   Speech pressured   Medication Sensitivity no observed side effects   Psychomotor / Gait balanced;steady   Activities of Daily Living   Hygiene/Grooming independent   Oral Hygiene independent   Dress independent   Room Organization independent

## 2017-05-28 NOTE — PROGRESS NOTES
Pt awake 1.75 hours. Came out into lounge for a snack, and then went back to room and prayed. Pleasant with staff. Nothing of concern to report.

## 2017-05-29 PROCEDURE — 12400001 ZZH R&B MH UMMC

## 2017-05-29 PROCEDURE — 25000132 ZZH RX MED GY IP 250 OP 250 PS 637: Performed by: STUDENT IN AN ORGANIZED HEALTH CARE EDUCATION/TRAINING PROGRAM

## 2017-05-29 RX ADMIN — HALOPERIDOL 2.5 MG: 2 TABLET ORAL at 22:10

## 2017-05-29 ASSESSMENT — ACTIVITIES OF DAILY LIVING (ADL)
DRESS: INDEPENDENT
GROOMING: INDEPENDENT
ORAL_HYGIENE: INDEPENDENT
ORAL_HYGIENE: INDEPENDENT
LAUNDRY: WITH SUPERVISION
GROOMING: INDEPENDENT
DRESS: STREET CLOTHES;INDEPENDENT

## 2017-05-29 NOTE — PROGRESS NOTES
Patient was visible in milieu, watching tv, writing, reading. Social with peers. Affect was full range, mood was calm. Pt let another male peer borrow one of her prayer rugs for Ramadan. Pt appears to be less restless and intrusive than she has been previously. No SI, hallucinations, med side effects noted.     05/28/17 2138   Behavioral Health   Hallucinations denies / not responding to hallucinations   Thinking distractable;delusional   Orientation person: oriented;place: oriented   Insight poor   Judgement impaired   Eye Contact at examiner   Affect full range affect   Mood mood is calm   Physical Appearance/Attire attire appropriate to age and situation   Hygiene well groomed   Suicidality (denies)   Self Injury (denies)   Activity restless   Speech pressured   Medication Sensitivity no observed side effects   Psychomotor / Gait balanced;steady   Activities of Daily Living   Hygiene/Grooming independent   Oral Hygiene independent   Dress independent   Room Organization independent

## 2017-05-29 NOTE — PROGRESS NOTES
Pt was visible in the milieu, watched a movie with peers. Pt denies SI/SIB, hallucinations, medication side affects. Pt's eyes were red and stated that was because she has been reading and watching TV a lot today. Pt is fasting for Ramadan and will not eat until 9pm this evening. Pt was social with peers and non intrusive.      05/29/17 1200   Behavioral Health   Hallucinations denies / not responding to hallucinations   Thinking distractable   Orientation person: oriented;place: oriented   Insight poor   Judgement impaired   Eye Contact at examiner   Affect full range affect   Mood mood is calm   Physical Appearance/Attire attire appropriate to age and situation   Hygiene well groomed   Suicidality other (see comments)  (denies)   Self Injury other (see comment)  (denies)   Activity other (see comment)  (visible in milieu )   Speech clear;coherent   Medication Sensitivity no stated side effects;no observed side effects   Psychomotor / Gait balanced;steady   Psycho Education   Type of Intervention 1:1 intervention   Response participates, initiates socially appropriate   Hours 0.5   Treatment Detail Check in    Activities of Daily Living   Hygiene/Grooming independent   Oral Hygiene independent   Dress independent   Laundry with supervision   Room Organization independent

## 2017-05-30 ENCOUNTER — OFFICE VISIT (OUTPATIENT)
Dept: INTERPRETER SERVICES | Facility: CLINIC | Age: 47
End: 2017-05-30

## 2017-05-30 PROCEDURE — 90853 GROUP PSYCHOTHERAPY: CPT

## 2017-05-30 PROCEDURE — 12400001 ZZH R&B MH UMMC

## 2017-05-30 PROCEDURE — 99232 SBSQ HOSP IP/OBS MODERATE 35: CPT | Mod: GC | Performed by: PSYCHIATRY & NEUROLOGY

## 2017-05-30 PROCEDURE — 25000132 ZZH RX MED GY IP 250 OP 250 PS 637: Performed by: STUDENT IN AN ORGANIZED HEALTH CARE EDUCATION/TRAINING PROGRAM

## 2017-05-30 PROCEDURE — T1013 SIGN LANG/ORAL INTERPRETER: HCPCS | Mod: U3

## 2017-05-30 PROCEDURE — 97150 GROUP THERAPEUTIC PROCEDURES: CPT | Mod: GO

## 2017-05-30 RX ADMIN — HALOPERIDOL 2.5 MG: 2 TABLET ORAL at 21:10

## 2017-05-30 ASSESSMENT — ACTIVITIES OF DAILY LIVING (ADL)
DRESS: STREET CLOTHES
ORAL_HYGIENE: INDEPENDENT
GROOMING: HANDWASHING;SHOWER;INDEPENDENT
LAUNDRY: WITH SUPERVISION

## 2017-05-30 NOTE — PROGRESS NOTES
----------------------------------------------------------------------------------------------------------  [facility], [place]   Psychiatric Progress Note     Assessment    Presentation: Hilda Delgadillo is a 46 year old female with a history of unspecified chronic psychosis who presented from the [facility] [place] after she became agitated and paranoid, refusing to get on the plane and saying her family manipulated her.  She takes risperidone, but has been off of it for 4 days according to her son.    Diagnostic Impression: Hilda Delgadillo is a 46 year old female with a history of unspecified chronic psychosis who was brought in by ambulance by family request as she became more agitated and paranoid while at the [facility] and refused to board the plane to return to her home in [place].  Her MSE is notable for agitation with poor insight and paranoia.  She denies substance abuse.  She was diagnosed with unspecified chronic psychosis in 2015.  She takes risperidone, which family says she takes disguised in drinks and food, but stopped taking her risperidone 4 days ago and told staff that she does not take any medications.   History and presentation of symptoms are consistent with diagnosis of chronic psychosis, likely late onset paranoid schizophrenia during menopausal transition, with decompensation due to cessation of anti-psychotics.    Hospital course: Hilda Delgadillo was admitted to station 22 on a 72 hour hold and placed on status 15.  She refused to take risperidone because she denied ever being on risperidone or having mental health issues.  The risperidone prescription bottle was listed for a name similar to Hilda's and the prescription came from [facility], which was also listed under under a different name.  However, family verified [facility] hospitalization, and this patient fit the same description as Hilda for presentation of symptoms, paranoia, and references to  Yvette.   At [facility], she referred to [facility] as her primary support system.  [place] tried to commit her, but it was not supported by the [facility].  Due to second hospitalization within two months, not having a home to return to, no insight, and refusing to take medications, commitment with clement was filed for her.  She was switched over from risperidone 1 mg PO to haloperidol 2.5 mg qHS with plans to eventually switch her to IM haldol decanoate (CASTELLON) but she refused to take any medications.  Family came to visit her and she was very argumentative and agitated during their visit; she continued to deny the need for medication despite persistent delusions during hospitalization.  She intermittently had more aggressive behavior with staff and other peers, and did not respond to redirection.  On 5/17 team was informed that [facility] had supported petition for committment and patient had been placed on a court hold.  Patient had problems with her roommate last week, as well as a new roommate over the weekend; would intermittently not respond to redirection or would become agitated and question redirection. She was given a single room. She reported at times a special relationship with Alison Crawford, e.g., he had sent her a personal letter telling her she could stay in the [place], or sent her flowers; at other times, she denied saying or believing these ideas.  Had her first court hearing Monday, 5/22 with second hearing scheduled for Thursday 5/25.  On 5/26, she was committed and jarvised; she was started on haldol 2.5 mg PO qHS or haldol 5 mg IM qHS if she refused PO.  Code green was called on the first day patient was required to take haldol. After code, she agreed to take haldol PO and continued to take scheduled haldol 2.5mg qHS without any side effects.  Team learned on 5/30 that family had fabricated a letter to Hilda purporting to be from [facility], containing the Presidential Seal and [facility]'s signature, inviting her to  "visit him while he was vacationing in the  in early May. The letter was obviously a fake--the seal was a digital image, grammar and letter structure were wrong, no return address, and so on. Apparently, this letter was an attempt to get Hilda to return to the  after her hospitalization at [facility] failed to result in definitive treatment, however the patient said she wrote back and told Obama that she was sorry but she was planning to stay in the [place] to pursue schooling--apparently that resulted in the family attempting to take her to the airport and force her to fly back to [place] under the ruse that she was being deported, which led to the current hospitalization. This letter had been alluded to by the patient, but it was thought that must be a delusion--the letter was brought to [facility] by garth and shown to staff on 5/28. On 5/30 this letter was reviewed by the team and the implications discussed: it seemed the patient was correct in stating that her family was against her and had been \"poisoning\" her (by covertly slipping her the risperidone), all in an attempt to get her to return to [place]. She had been taking haloperidol over the weekend with reported improvement in her attitude and irritability, with no doses needed to be forced IM.    Medical course: None.    Plan     Principal Diagnosis: Psychosis NEC vs Schizophrenia     Medications:   -haloperidol 2.5 mg PO qHS  -haloperidol 2 mg PO q2h PRN for agitation or haloperidol 5 mg IM q2h PRN for agitation  -benztropine 1 mg PO BID PRN for EPSE   -benztropine 1 mg IM daily for acute dystonic reaction    Laboratory/Imaging: On admission: UTox negative, CBC, TSH and CMP WNL   -elevated cholesterol 207 and .   -elevated FSH cw menopausal status (LMP 2 years ago)   -risperidone level <0.5; metabolite 2.4  Consults: none  Patient will be treated in therapeutic milieu with appropriate individual and group therapies as described.    Medical diagnoses to be " addressed this admission:  None.    Relevant psychosocial stressors: chronic mental illness, poor medication compliance, arguments with family    Legal Status: Commitment with vaughan    Safety Assessment:   Checks: Status 15  Precautions: Suicide, SIB, single room  Pt has not required locked seclusion or restraints in the past 24 hours to maintain safety, please refer to RN documentation for further details.    The risks, benefits, alternatives and side effects have been discussed and are understood by the patient and other caregivers.    Anticipated Disposition/Discharge Date: pending medication adjustment and mental stabilization - court hearing 5/24    --------------------------------------------------    Scribed by Hugo Batista MS4, for Dr. vYan Werner, resident.  Hugo Batista, ADRIENNE  [phone]    I, Yvan Werner DO, have reviewed and edited the documentation recorded by the scribe.  This documentation accurately reflects the services I personally performed and treatment decisions made by me in consultation with the attending physician.    Yvan Werner DO  Resident Psychiatrist, PGY-1  Pager: [phone]    Psychiatry Attending Attestation:  This patient has been seen and evaluated by me, Eyal Hand M.D.  The patient's condition and treatment plan were discussed with the resident, and care coordinated with the CTC and RN. I reviewed, edited and agree with the findings and plan in this note.     Eyal Hand M.D.   of Psychiatry     Interim History:   The patient's care was discussed with the treatment team and chart notes were reviewed.     Sleep: 6 hours  PRN's: None  Staff Notes: Patient was informed of commitment on Friday, was very agitated.  Code green was called when she refused her medication, but she eventually took haldol PO and has since taken it willingly.  No medication side effects or hallucinations noted.  Tense visit with family; affect was full range, but  "patient was irritable and tense.  Patient continues to believe that she has personal relationship with former President Obama; this is further complicated by family informing staff that they have written fake letters to her from \"Obama.\"  Family wrote one encouraging her to return to [place] and meet him prior to hospitalization (Hilda has this letter, with forged presidential seal and signature, in her room now); family also reported having written another from President Obama to discourage her from pursuing a relationship with him because he is .  Per patient's sister, family has written several letters signed by Obama.  Patient was encouraged to follow doctor's advice and to improve familial relationships by staff .  Patient is participating in Ramadan.  Pleasant to staff, noted to be less intrusive with peers.  She let a male peer borrow one of her prayer rugs for Ramadan.  Continues to deny illness.  Had bouts of agitation with select peers, her son, and on the phone.  Told staff her son is delusional thinking there is something wrong with her, but she has no power to fight her son because she is a [employer] woman.  Patient's son spoke to staff and told them he was bringing phone  to humor her, but advised staff to not let her use her phone because historically it has been a problem and is a significant trigger for her delusions as patient believes she receives messages through phone.     Treatment Meeting:  Patient met with the treatment team in the conference room this AM.  Discussed that she'd been shocked after court, but says she is doing better today.  She does not feel different since starting medication; denies auditory hallucinations.  She denies drowsiness or muscle stiffness.  Family visited with her Sunday; she said that she and her son were both upset with each other because he tells her untrue things.  When prompted, she explained saying that her son plans to go to [place] for " "his studies, but she knows that is untrue because he does have the finances to do so.     After new reports from family that they had written her letters pretending to be from ObTrinchera, team asked her about letters.  The first and only letter she remembers receiving from \"Obama\" was given to her sometime in April 2017; she and her sister got it from the mailbox in [place] and Hilda opened the envelope.  She cannot remember where the return address was from.  She never received any letters or communication from ObTrinchera prior to [place]; however, hospital documentation from [place] endorsed her talking about a relationship and communication with Obama.  She received the letter from [facility] after her hospitalization in [place].  Hilda said that she wrote two letters to ObTrinchera; the first letter she received a response to, but she never got a response to the second letter as she was hospitalized.  She gave the team the letter: it was an unofficial looking letter with a pixelated presidential seal that was addressed to Hilda.  Poor grammar was noted in the letter, and it was on a plain white piece of paper.  The letter had a large signature from [facility] and the letter said he would love to meet with her in the [place] because he is there on vacation from May 4-May 15.  Her response letter to him was that she cannot meet him because she wants to stay in [place]; despite going to the airport, she had no plans to return to the [place] and says her family tricked her.  Team discussed with her that they believe she received the letter, but they questioned where the letter came from exactly and its authenticity.  Team made a copy of letter.  She showed the letter to her family, nurses at [facility] this past weekend, and the team today, but she never showed the letter to anyone during the court process.  When previously asked about the letter, she said it was missing, and she told the team her son returned the letter to her recently.  " "Unsure of exact timeline when he returned letter to her.      Hilda asked the team why she needs to take medication and for how long.  Team advised her that she was put on medication due to her delusions and paranoia, but team is unsure how long she will be on medication due to recent events.  Hilda said that she is not psychotic and that this was organized by her family.  Team advised her that they will be speaking to court again now that we have seen this letter from [facility].    Review of systems:     The Review of Systems is negative other than noted above.         Medications:     Current Facility-Administered Medications   Medication     haloperidol (HALDOL) tablet 2.5 mg     benztropine (COGENTIN) tablet 1 mg     benztropine mesylate (COGENTIN) injection 1 mg     haloperidol lactate (HALDOL) injection 5 mg    Or     haloperidol (HALDOL) tablet 2 mg     acetaminophen (TYLENOL) tablet 650 mg          Allergies:   No Known Allergies         Psychiatric Examination:   /62  Pulse 75  Temp 98  F (36.7  C) (Oral)  Resp 16  Ht 1.702 m (5' 7\")  Wt 78.9 kg (174 lb)  BMI 27.25 kg/m2  Weight is 174 lbs 0 oz  Body mass index is 27.25 kg/(m^2).    Appearance:  appeared as age stated, well groomed, wearing hijab and dress.   Attitude:  cooperative  Eye Contact:  good  Mood:  Sad, but \"doing fine.\" No agitation, but was anxious during meeting.  Affect:  intensity is blunted. Neutral. Mood congruent.   Speech:  clear, coherent, knows minimal English, regular rate and rhythm  Psychomotor Behavior:  no evidence of tardive dyskinesia, dystonia, or tics. Fidgets with glasses when anxious.  Thought Process:  Linear in her responses. Can present reasonable thought process and explanations, but her responses not consistent with staff or peer reports of her behavior   Associations:  no loose associations  Thought Content:  Delusions present, non-bizarre but picture is becoming more convoluted for the extent of her " delusions.  No SI or hallucinations noted.  Insight:  very poor; though letter turned out to be real, poor insight into how unlikely it would be for President Yvette to write such a personal letter  Judgment:  poor  Oriented to:  time, person, and place  Attention Span and Concentration:  intact  Recent and Remote Memory:  limited  Language: limited English; uses Jamaican   Fund of Knowledge: appropriate to level of education  Muscle Strength and Tone: normal  Gait and Station: Normal         Labs:   Risperidone level <0.5  9-hydroxyrisperidone level: 2.4  Combined total risperidone and metabolite: 2.4  FSH 77.8    Recent Labs   Lab Test  05/15/17   0753   CHOL  207*   TRIG  64   LDL  123*   HDL  71     Recent Labs   Lab Test  05/15/17   0753   CR  0.65   GFRESTIMATED  >90  Non  GFR Calc     BUN  10   NA  144   POTASSIUM  3.9   FREDRICK  8.7   GLC  100*     Recent Labs   Lab Test  05/15/17   0753   AST  14   ALT  27   ALKPHOS  84   BILITOTAL  0.3   PROTTOTAL  7.7   ALBUMIN  3.5     Recent Labs   Lab Test  05/15/17   0753   WBC  8.5   HGB  13.5   PLT  196     Recent Labs   Lab Test  05/15/17   0753   TSH  1.09

## 2017-05-30 NOTE — PROGRESS NOTES
Pt present and active w/in milieu throughout the evening. Appeared calm with bouts of agitation directed at select peers, her son (visitor), and on the phone. Pt states that her son is delusional in thinking there is something wrong with her. Pt believes that she has no power to fight her son on this because she is a [employer] woman. Pt denies that she is sick or has any mh problems. Pt states that the medication has no side effects nor any benefit. Spoke to pt's son who wanted to discuss her cell phone use. He told this writer that he was humoring her by bringing a  so she could use her phone; however he warned that she must not be allowed to use it has historically been a big problem and that it remains part of her delusions and a significant trigger. He states that she believes she is receiving messages through the phone. Pt denies si/sib. Denies all mh sx.            05/29/17 2000   Behavioral Health   Hallucinations denies / not responding to hallucinations   Thinking distractable;delusional   Orientation person: oriented;place: oriented;date: oriented;time: oriented   Memory baseline memory   Insight denial of illness;poor   Eye Contact at examiner   Affect full range affect   Mood mood is calm   Physical Appearance/Attire attire appropriate to age and situation   Hygiene well groomed   Suicidality other (see comments)  (denies)   Self Injury other (see comment)  (denies)   Activity restless;other (see comment)  (present and active in milieu)   Speech clear;coherent   Medication Sensitivity no stated side effects;no observed side effects   Psychomotor / Gait balanced;steady   Psycho Education   Type of Intervention 1:1 intervention   Response participates, initiates socially appropriate   Hours 0.5   Treatment Detail check in   Group Therapy Session   Group Attendance attended group session   Group Type community   Activities of Daily Living   Hygiene/Grooming independent   Oral Hygiene independent   Dress  street clothes;independent   Room Organization independent   Activity   Activity Level of Assistance independent

## 2017-05-31 ENCOUNTER — OFFICE VISIT (OUTPATIENT)
Dept: INTERPRETER SERVICES | Facility: CLINIC | Age: 47
End: 2017-05-31

## 2017-05-31 PROCEDURE — 97150 GROUP THERAPEUTIC PROCEDURES: CPT | Mod: GO

## 2017-05-31 PROCEDURE — 25000132 ZZH RX MED GY IP 250 OP 250 PS 637: Performed by: STUDENT IN AN ORGANIZED HEALTH CARE EDUCATION/TRAINING PROGRAM

## 2017-05-31 PROCEDURE — 99232 SBSQ HOSP IP/OBS MODERATE 35: CPT | Mod: GC | Performed by: PSYCHIATRY & NEUROLOGY

## 2017-05-31 PROCEDURE — 90853 GROUP PSYCHOTHERAPY: CPT

## 2017-05-31 PROCEDURE — T1013 SIGN LANG/ORAL INTERPRETER: HCPCS | Mod: U3

## 2017-05-31 PROCEDURE — 12400001 ZZH R&B MH UMMC

## 2017-05-31 RX ADMIN — HALOPERIDOL 2.5 MG: 2 TABLET ORAL at 22:20

## 2017-05-31 ASSESSMENT — ACTIVITIES OF DAILY LIVING (ADL)
LAUNDRY: WITH SUPERVISION
ORAL_HYGIENE: INDEPENDENT
HYGIENE/GROOMING: INDEPENDENT
ORAL_HYGIENE: INDEPENDENT
DRESS: STREET CLOTHES;INDEPENDENT
GROOMING: INDEPENDENT
DRESS: INDEPENDENT;STREET CLOTHES

## 2017-05-31 NOTE — PLAN OF CARE
Problem: Psychotic Symptoms  Goal: Psychotic Symptoms  Signs and symptoms of listed problems will be absent or manageable.  -pt will verbalize coping skills to manage hallucinations  -pt will participate in groups demonstrating adequate attention span  -pt will identify when or if they are having any medication side effects  -pt will be able to have reality based conversations   Outcome: Improving  Hilda did not attend community meeting until joining at the very end. She said nothing. She has been social with some peers, not harassing S.Y. She has been fasting for Ramadan, ate meal late that was provided by S.Y.'s family. Hilda was asked if she would take her medication? She said she would take it after eating, which she did. She denies any issues or symptoms of MI.

## 2017-05-31 NOTE — PROGRESS NOTES
Hilda consistently comes to all OT sessions, is pleasant and actively engaged in groups despite limited english.  Good planning, focus and follow through on task.

## 2017-05-31 NOTE — PROGRESS NOTES
----------------------------------------------------------------------------------------------------------  [facility], [place]   Psychiatric Progress Note     Assessment    Presentation: Hilda Delgadillo is a 46 year old female with a history of unspecified chronic psychosis who presented from the [facility] after she became agitated and paranoid, refusing to get on the plane and saying her family manipulated her.  She takes risperidone, but has been off of it for 4 days according to her son.    Diagnostic Impression: Hilda Delgadillo is a 46 year old female with a history of unspecified chronic psychosis who was brought in by ambulance by family request as she became more agitated and paranoid while at [facility] and refused to board the plane to return to her home in [place].  Her MSE is notable for agitation with poor insight and paranoia.  She denies substance abuse.  She was diagnosed with unspecified chronic psychosis in 2015.  She takes risperidone, which family says she takes disguised in drinks and food, but stopped taking her risperidone 4 days ago and told staff that she does not take any medications.   History and presentation of symptoms are consistent with diagnosis of chronic psychosis, likely late onset paranoid schizophrenia during menopausal transition, with decompensation due to cessation of anti-psychotics.    Hospital course: Hilda Delgadillo was admitted to station 22 on a 72 hour hold and placed on status 15.  She refused to take risperidone because she denied ever being on risperidone or having mental health issues.  The risperidone prescription bottle was listed for a name similar to Hilda's and the prescription came from [place], which was also listed under under a different name.  However, family verified [place] hospitalization, and this patient fit the same description as Hilda for presentation of symptoms, paranoia, and references to  Yvette.   At [facility], she referred to [facility] as her primary support system.  [place] tried to commit her, but it was not supported by the [facility].  Due to second hospitalization within two months, not having a home to return to, no insight, and refusing to take medications, commitment with clement was filed for her.  She was switched over from risperidone 1 mg PO to haloperidol 2.5 mg qHS with plans to eventually switch her to IM haldol decanoate (CASTELLON) but she refused to take any medications.  Family came to visit her and she was very argumentative and agitated during their visit; she continued to deny the need for medication despite persistent delusions during hospitalization.  She intermittently had more aggressive behavior with staff and other peers, and did not respond to redirection.  On 5/17 team was informed that [facility] had supported petition for committment and patient had been placed on a court hold.  Patient had problems with her roommate last week, as well as a new roommate over the weekend; would intermittently not respond to redirection or would become agitated and question redirection. She was given a single room. She reported at times a special relationship with Alison Crawford, e.g., he had sent her a personal letter telling her she could stay in the [place], or sent her flowers; at other times, she denied saying or believing these ideas.  Had her first court hearing Monday, 5/22 with second hearing scheduled for Thursday 5/25.  On 5/26, she was committed and jarvised; she was started on haldol 2.5 mg PO qHS or haldol 5 mg IM qHS if she refused PO.  Code green was called on the first day patient was required to take haldol. After code, she agreed to take haldol PO and continued to take scheduled haldol 2.5mg qHS without any side effects.  Team learned on 5/30 that family had written letters to patient pretending to be [place]; patient was able to produce letter that told her President Yvette wanted to  meet her in [place].  Patient received this letter after her hospitalization in [place], and [facility] documentation does describe delusions about Yvette.  This letter had been missing since beginning of present admission but was brought to the facility by her family and first shown to staff by patient 5/28.  Team spoke to risk management and has plans to speak to the court with the new evidence, which has decreased the validity of the family's collateral.    Medical course: None.    Plan     Principal Diagnosis: Psychosis NEC vs Schizophrenia vs Bipolar currently manic with psychotic features    Medications:   -haloperidol 2.5 mg PO qHS  -haloperidol 2 mg PO q2h PRN for agitation or haloperidol 5 mg IM q2h PRN for agitation  -benztropine 1 mg PO BID PRN for EPSE   -benztropine 1 mg IM daily for acute dystonic reaction    Laboratory/Imaging: On admission: UTox negative, CBC, TSH and CMP WNL   -elevated cholesterol 207 and .   -elevated FSH cw menopausal status (LMP 2 years ago)   -risperidone level <0.5; metabolite 2.4  Consults: none  Patient will be treated in therapeutic milieu with appropriate individual and group therapies as described.    Medical diagnoses to be addressed this admission:  None.    Relevant psychosocial stressors: chronic mental illness, poor medication compliance, arguments with family    Legal Status: Commitment with vaughan    Safety Assessment:   Checks: Status 15  Precautions: Suicide, SIB, single room  Pt has not required locked seclusion or restraints in the past 24 hours to maintain safety, please refer to RN documentation for further details.    The risks, benefits, alternatives and side effects have been discussed and are understood by the patient and other caregivers.    Anticipated Disposition/Discharge Date: pending medication adjustment and mental stabilization     --------------------------------------------------    Scribed by Hugo Batista, MS4, for Dr. Albert Shanks,  resident.  [place], MS4  [phone]  ----------------------------------------------------------------------------------------------------------------------  I have reviewed and edited the documentation recorded by the scribe. This documentation accurately reflects the services I personally performed and treatment decisions made by me in consultation with the attending physician.     Albert Shanks PGY-1  pg [phone]   4:56 PM 5/31/2017    Psychiatry Attending Attestation:  This patient has been seen and evaluated by me, Eyal Hand M.D.  The patient's condition and treatment plan were discussed with the resident, and care coordinated with the CTC and RN. I reviewed, edited and agree with the findings and plan in this note.     Eyal Hand M.D.   of Psychiatry   Interim History:   The patient's care was discussed with the treatment team and chart notes were reviewed.     Sleep: 5.25 hours  PRN's: None  Staff Notes: Attended community meeting at end, but did not participate.  Selectively social, doing better with female [place] peer she was harassing previous days.  She is fasting for Ramadan, and at meal late that was provided by female [place] peer's family. Denies any mental illness symptoms.  Took medication after meal without problem.     Treatment Meeting:  Patient is doing fine.  Denies any stiffness or drowsiness from haldol, but says she's getting slightly more sleep.  She prefers haldol over the risperidone as risperidone made her dizzy and not eat or sleep.  She had not noticed a change in her thinking since she started the haldol.  She spoke to son on phone, mentioned that he is getting sick; they were not upset with each other on the phone.  She shared a meal with female [place] peer yesterday for Ramadan, which she said went well.  Team encouraged this positive interaction as patient previously had conflicts with this peer; patient smiled, but did not acknowledge  "having conflicts.  Team told her they were going to speak to court about letter because as of now, this letter and what her family recently told us supports what patient was telling us about how her family was manipulating her to get her to return to the .  Patient wanted to now why she needed to take the medication because she doesn't have an illness.  Team told her she will still be taking the medications because she is committed with vaughan as of now, and team has noticed improvements in her interactions with others since starting the medication.  Team advised her they want her to eventually switch over to an long lasting injection, which she does not want, because she has shown previously that she will not keep taking pills after discharging.  Patient did not seem happy with this decision, but did not argue further.    Review of systems:     The Review of Systems is negative other than noted above.         Medications:     Current Facility-Administered Medications   Medication     haloperidol (HALDOL) tablet 2.5 mg     benztropine (COGENTIN) tablet 1 mg     benztropine mesylate (COGENTIN) injection 1 mg     haloperidol lactate (HALDOL) injection 5 mg    Or     haloperidol (HALDOL) tablet 2 mg     acetaminophen (TYLENOL) tablet 650 mg          Allergies:   No Known Allergies         Psychiatric Examination:   /62  Pulse 66  Temp 97.6  F (36.4  C) (Tympanic)  Resp 16  Ht 1.702 m (5' 7\")  Wt 78.9 kg (174 lb)  BMI 27.25 kg/m2  Weight is 174 lbs 0 oz  Body mass index is 27.25 kg/(m^2).    Appearance:  appeared as age stated, well groomed, wearing hijab and dress.   Attitude:  cooperative  Eye Contact:  good  Mood:  Sad, but \"doing fine.\" Mildly anxious. No agitation.  Affect:  intensity is blunted. Neutral. Mood congruent.   Speech:  clear, coherent, knows minimal English, regular rate and rhythm  Psychomotor Behavior:  no evidence of tardive dyskinesia, dystonia, or tics. Fidgets with glasses and pens " when anxious.  Thought Process:  Linear in her responses. Can present reasonable thought process and explanations, but her responses not consistent with staff or peer reports of her behavior when she was having interpersonal conflicts.  Associations:  no loose associations  Thought Content:  Delusions present, non-bizarre but picture is becoming more convoluted for the extent of her delusions.  No SI or hallucinations noted. Overall very confused with everything going on, as would be expected.  Insight:  very poor; though letter turned out to be real, poor insight into how unlikely it would be for Presnatalia Crawford to write such a personal letter  Judgment:  poor  Oriented to:  time, person, and place  Attention Span and Concentration:  intact  Recent and Remote Memory:  limited  Language: limited English; uses [employer]   Fund of Knowledge: appropriate to level of education  Muscle Strength and Tone: normal  Gait and Station: Normal         Labs:   Risperidone level <0.5  9-hydroxyrisperidone level: 2.4  Combined total risperidone and metabolite: 2.4  FSH 77.8    Recent Labs   Lab Test  05/15/17   0753   CHOL  207*   TRIG  64   LDL  123*   HDL  71     Recent Labs   Lab Test  05/15/17   0753   CR  0.65   GFRESTIMATED  >90  Non  GFR Calc     BUN  10   NA  144   POTASSIUM  3.9   FREDRICK  8.7   GLC  100*     Recent Labs   Lab Test  05/15/17   0753   AST  14   ALT  27   ALKPHOS  84   BILITOTAL  0.3   PROTTOTAL  7.7   ALBUMIN  3.5     Recent Labs   Lab Test  05/15/17   0753   WBC  8.5   HGB  13.5   PLT  196     Recent Labs   Lab Test  05/15/17   0753   TSH  1.09

## 2017-05-31 NOTE — PROGRESS NOTES
Active particip in most groups. More quiet today, a bit more isolative to self in the milieu. Less intrusive than previous days. Ate meals.       05/31/17 1000   Behavioral Health   Hallucinations appears responding;other (see comment)  (Looking into space smiling at times.)   Thinking distractable;poor concentration   Orientation person: oriented;place: oriented   Insight denial of illness;poor   Judgement impaired   Eye Contact at examiner   Affect full range affect   Mood anxious   Physical Appearance/Attire attire appropriate to age and situation   Hygiene other (see comment)  (adequate)   Activity other (see comment)  (Visible in the milieu. Active particip in groups.)   Speech clear   Psychomotor / Gait balanced;steady   Psycho Education   Type of Intervention structured groups   Response observes from a distance   Hours 0.5   Treatment Detail Warning signs   Activities of Daily Living   Hygiene/Grooming independent   Oral Hygiene independent   Dress independent;street clothes   Laundry with supervision   Room Organization independent

## 2017-06-01 ENCOUNTER — OFFICE VISIT (OUTPATIENT)
Dept: INTERPRETER SERVICES | Facility: CLINIC | Age: 47
End: 2017-06-01

## 2017-06-01 PROCEDURE — 99233 SBSQ HOSP IP/OBS HIGH 50: CPT | Mod: GC | Performed by: PSYCHIATRY & NEUROLOGY

## 2017-06-01 PROCEDURE — H2032 ACTIVITY THERAPY, PER 15 MIN: HCPCS

## 2017-06-01 PROCEDURE — 90853 GROUP PSYCHOTHERAPY: CPT

## 2017-06-01 PROCEDURE — T1013 SIGN LANG/ORAL INTERPRETER: HCPCS | Mod: U3

## 2017-06-01 PROCEDURE — 25000132 ZZH RX MED GY IP 250 OP 250 PS 637: Performed by: STUDENT IN AN ORGANIZED HEALTH CARE EDUCATION/TRAINING PROGRAM

## 2017-06-01 PROCEDURE — 12400001 ZZH R&B MH UMMC

## 2017-06-01 PROCEDURE — 97150 GROUP THERAPEUTIC PROCEDURES: CPT | Mod: GO

## 2017-06-01 RX ORDER — HALOPERIDOL 5 MG/ML
2.5 INJECTION INTRAMUSCULAR AT BEDTIME
Status: DISCONTINUED | OUTPATIENT
Start: 2017-06-01 | End: 2017-06-02

## 2017-06-01 RX ADMIN — HALOPERIDOL 2.5 MG: 2 TABLET ORAL at 21:58

## 2017-06-01 ASSESSMENT — ACTIVITIES OF DAILY LIVING (ADL)
ORAL_HYGIENE: INDEPENDENT
GROOMING: INDEPENDENT
GROOMING: HANDWASHING;SHOWER;INDEPENDENT
DRESS: STREET CLOTHES
DRESS: STREET CLOTHES;INDEPENDENT
LAUNDRY: WITH SUPERVISION
ORAL_HYGIENE: INDEPENDENT
LAUNDRY: WITH SUPERVISION

## 2017-06-01 NOTE — PROGRESS NOTES
Pt present and active w/in milieu all throughout the evening. Pt mostly kept to herself but was occasionally social with peers. Pt was friendly and polite with staff when approached. Pt continues with her delusion that there is nothing wrong with her and that her children aer conspired against her. Pt states that there is nothing wrong with her and that she has no mh sx. Pt appears to be responding to internal stimuli and seems to loose focus periodically. Pt st     05/31/17 2000   Behavioral Health   Hallucinations appears responding;denies / not responding to hallucinations   Thinking distractable;delusional;poor concentration   Orientation person: oriented;place: oriented;date: oriented;time: oriented   Memory baseline memory   Insight poor   Judgement impaired   Eye Contact at examiner   Affect blunted, flat   Mood mood is calm   Physical Appearance/Attire attire appropriate to age and situation   Hygiene well groomed   Suicidality other (see comments)  (denies)   Self Injury other (see comment)  (denies)   Activity other (see comment)  (present in milieu)   Speech clear;coherent   Medication Sensitivity sedation;no stated side effects   Psychomotor / Gait balanced;steady   Psycho Education   Type of Intervention 1:1 intervention   Response participates, initiates socially appropriate   Hours 0.5   Treatment Detail check in   Group Therapy Session   Group Attendance attended group session   Group Type community   Activities of Daily Living   Hygiene/Grooming independent   Oral Hygiene independent   Dress street clothes;independent   Room Organization independent   Activity   Activity Level of Assistance independent   ates that the medication is fine but is having no negative nor positive effects. Pt denies si/sib. Denies all mh sx.         05/31/17 2000   Behavioral Health   Hallucinations appears responding;denies / not responding to hallucinations   Thinking distractable;delusional;poor concentration    Orientation person: oriented;place: oriented;date: oriented;time: oriented   Memory baseline memory   Insight poor   Judgement impaired   Eye Contact at examiner   Affect blunted, flat   Mood mood is calm   Physical Appearance/Attire attire appropriate to age and situation   Hygiene well groomed   Suicidality other (see comments)  (denies)   Self Injury other (see comment)  (denies)   Activity other (see comment)  (present in milieu)   Speech clear;coherent   Medication Sensitivity sedation;no stated side effects   Psychomotor / Gait balanced;steady   Psycho Education   Type of Intervention 1:1 intervention   Response participates, initiates socially appropriate   Hours 0.5   Treatment Detail check in   Group Therapy Session   Group Attendance attended group session   Group Type community   Activities of Daily Living   Hygiene/Grooming independent   Oral Hygiene independent   Dress street clothes;independent   Room Organization independent   Activity   Activity Level of Assistance independent

## 2017-06-01 NOTE — PROGRESS NOTES
----------------------------------------------------------------------------------------------------------  [facility], [place]   Psychiatric Progress Note     Assessment    Presentation: Hilda Delgadillo is a 46 year old female with a history of unspecified chronic psychosis who presented from the [facility] after she became agitated and paranoid, refusing to get on the plane and saying her family manipulated her.  She takes risperidone, but has been off of it for 4 days according to her son.    Diagnostic Impression: Hilda Delgadillo is a 46 year old female with a history of unspecified chronic psychosis who was brought in by ambulance by family request as she became more agitated and paranoid while at the [facility] and refused to board the plane to return to her home in [place].  Her MSE is notable for agitation with poor insight and paranoia.  She denies substance abuse.  She was diagnosed with unspecified chronic psychosis in 2015.  She takes risperidone, which family says she takes disguised in drinks and food, but stopped taking her risperidone 4 days ago and told staff that she does not take any medications.   History and presentation of symptoms are consistent with diagnosis of chronic psychosis, likely late onset paranoid schizophrenia during menopausal transition, with decompensation due to cessation of anti-psychotics.    Hospital course: Hilda Delgadillo was admitted to station 22 on a 72 hour hold and placed on status 15.  She refused to take risperidone because she denied ever being on risperidone or having mental health issues.  The risperidone prescription bottle was listed for a name similar to Hilda's and the prescription came from [facility], which was also listed her under under a different name.  However, family verified [facility] hospitalization, and this patient fit the same description as Hilda for presentation of symptoms, paranoia, and references to  Yvette.   At [facility], she referred to [facility] as her primary support system.    [place] tried to commit her, but it was not supported by the [facility].  Due to second hospitalization within two months, not having a home to return to, no insight, and refusing to take medications, commitment with clement was filed for her.  She was switched over from risperidone to haloperidol with plans to eventually switch her to IM haldol decanoate (CASTELLON).  Family would visit her, but she was very argumentative and agitated during each visit; she continued to deny the need for medication despite persistent delusions during hospitalization.   [facility] supported petition for committment and patient was placed on a court hold.  She intermittently had more aggressive behavior with staff and other peers, and did not respond to redirection or would ignore it. Eventually, she was given a single room. She reported at times a special relationship with Alison Crawford, e.g., he had sent her a personal letter telling her she could stay in the [place], or sent her flowers; at other times, she denied saying or believing these ideas.  Patient had 2 hearings; she was committed and jarvised.  Behavior code was called on the first day patient was required to take haldol, but IM was not needed as she agreed to take PO.  Patient continued to take scheduled haldol without any side effects.  Five days after commitment was place, staff learned that family had written letters to patient pretending to be President Yvette; patient was able to produce letter that told her President Yvette wanted to meet her in [place].  Patient received this letter after her hospitalization in [place]; however, [facility] documentation does describe delusions about [place].  This letter had been missing since beginning of present admission but was brought to the facility by her family and first shown to staff by patient 2 days after she was committed.  Team spoke to risk management and  spoke to the court with the new evidence.  The  said the  and  would review the new evidence, but as of now, there was enough evidence to uphold the commitment and vaughan.  Patient had little insight in her mental illness as well as poor insight as to what would be involved if she lived in the [place] without family support.    Medical course: None.    Plan     Principal Diagnosis: Psychosis NEC  Eval for schizophrenia vs schizoaffective d/o    Medications:   -haloperidol 2.5 mg PO qHS  -haloperidol 2 mg PO q2h PRN for agitation or haloperidol 5 mg IM q2h PRN for agitation  -benztropine 1 mg PO BID PRN for EPSE   -benztropine 1 mg IM daily for acute dystonic reaction    Laboratory/Imaging: On admission: none new    Consults: none    Patient will be treated in therapeutic milieu with appropriate individual and group therapies as described.    Medical diagnoses to be addressed this admission:  None.    Relevant psychosocial stressors: chronic mental illness, poor medication compliance, arguments with family    Legal Status: Commitment with [facility]an    Safety Assessment:   Checks: Status 15  Precautions: Suicide, SIB, single room  Pt has not required locked seclusion or restraints in the past 24 hours to maintain safety, please refer to RN documentation for further details.    The risks, benefits, alternatives and side effects have been discussed and are understood by the patient and other caregivers.    Anticipated Disposition/Discharge Date: Pending medication adjustment and stabilization.       Scribed by Hugo Batista, MS4, for Dr. Albert Shanks, resident.  ----------------------------------------------------------------------------------------------------------------------  I have reviewed and edited the documentation recorded by the scribe. This documentation accurately reflects the services I personally performed and treatment decisions made by me in consultation with the attending  physician.     Albert Shanks PGY-1  pg [phone]   3:34 PM 6/1/2017    Psychiatry Attending Attestation:  This patient has been seen and evaluated by me, Eyal Hand M.D.  The patient's condition and treatment plan were discussed with the resident, and care coordinated with the CTC and RN. I reviewed, edited and agree with the findings and plan in this note.    Extended team meeting x 40 minutes with  of which 30' was engaged incounseling the patient as noted below. Also see separate Progress Note [facility].     Eyal Hand M.D.   of Psychiatry   Interim History:   The patient's care was discussed with the treatment team and chart notes were reviewed.     Sleep: 5.5 hours  PRN's: None  Staff Notes:  Patient participated in most groups.  Was quiet and more isolative today, but was less intrusive than previous days.  Pleasant and engaged in OT groups despite limited English.  Good planning and focus.  Staff noted that she continued to state that nothing was wrong with her and has no mental health symptoms, but noticed that she appeared to be responding to internal stimuli.     Treatment Meeting:  Patient stated that she was doing well.  Team discussed with the recent court developments since the hospital got the letter, which was written by someone in her family, but would encourage her delusions to think she had a friendship with President Yvette.  She again denied ever saying she had a friendship with him.  Team speculated that family likely wrote letter because they know she needs treatment, and wanted to get her help and tried to get patient to return to [place] where they hoped she could get help, or if she was in the [place], she wouldn't be their problem.  Team said that court was notified of this letter, but court said that the commitment and vaughan are still being upheld because there was enough evidence to have her  "committed with vaughan.  Her  will also review these documents.  Team let her know that we disapprove of what her family did.  Patient wanted to know what the evidence was and how long she will be here.  Team discussed with her paranoia and prior delusions with Yvette before she received the letter, as documented in hospital records at [place], as well as being let go from her job in the [place] due to ripping up papers she thought were talking about her.  She again denied having any delusions of paranoia.  She again asked why she was taking medication.  Team said there was evidence of a psychosis in her, despite the letter, and discussed the term \"psychosis\" with her.  She again denied any mental illness.  Team discussed court process with her that determined her commitment status.     Hilda was visibly upset when team discussed eventually putting her on injection medication; she does not think she needs it and wants her  to know that she is getting injection at some point.  Team advised her that  knew because the vaughan was in place.  She asked team what to do about her family, but team told her that was her decision to make.  Team told her that family likely lied to her because they were worried about her and she has a history of not believing she has an illness and not want hospitalization in the , [facility] [place], and not here.   Team discussed with her that she can stay here and try to forge relationship with her family or she can separate from her family and return to the [place].  She became upset and said she would not return to the [place].  She again discussed having a 2 year visa that lets [place] stay in [place]; team discussed with her (and reminded her of previous discussions) that it is a 2 year visa for travel, but she can only stay in one country for 90 days at a time.  She again said that it was 2 year visa for the [place].  Team told her if she wants to separate from her family and stay in [place], " "then she needs to get her own  and figure out the visa information on her own.  Team told her that we can have a family meeting to help her if she does want to stay in [place], but that she needs her family's support.  She again said that wants to separate her family and does not want to work with him.  She said she plans to rent an apartment in [place], but team discussed with her that she may not get an apartment because she does not have a job to pay for it or they may want to see her visa, which would not be valid.  She still was planning to get an , but team told her she would have to do that on her own if she wants to separate from her family.  Discussion did not lead anywhere as Hilda did not listen or still did not understand her visa status as well as what would be required for her to live on her own.  Patient thanked the team at end of visit.    Review of systems:     The Review of Systems is negative other than noted above.         Medications:   Scheduled  Current Facility-Administered Medications   Medication Dose Route Frequency     haloperidol  2.5 mg Oral At Bedtime    Or     haloperidol lactate  2.5 mg Intramuscular At Bedtime       PRN  Current Facility-Administered Medications   Medication Dose Route Frequency     benztropine  1 mg Oral BID PRN     benztropine mesylate  1 mg Intramuscular BID PRN     haloperidol lactate  5 mg Intramuscular Q2H PRN    Or     haloperidol  2 mg Oral Q2H PRN     acetaminophen  650 mg Oral Q4H PRN          Allergies:   No Known Allergies         Psychiatric Examination:   /62  Pulse 66  Temp 97.4  F (36.3  C) (Tympanic)  Resp 16  Ht 1.702 m (5' 7\")  Wt 78.2 kg (172 lb 8 oz)  BMI 27.02 kg/m2  Weight is 172 lbs 8 oz  Body mass index is 27.02 kg/(m^2).    Appearance:  appeared as age stated, well groomed, wearing hijab and dress.   Attitude:  cooperative  Eye Contact:  good  Mood:  \"doing fine.\" Anxious. Became more agitated " throughout the meeting.  Affect:  intensity is blunted and can intermittently become heightened when agitated.  Speech:  clear, coherent, knows minimal English, regular rate and rhythm. Volume and pace of speech increased as she became more agitated.  Psychomotor Behavior:  no evidence of tardive dyskinesia, dystonia, or tics. Hands shaking and fidgeting with glasses and pens as she became more agitated.  Thought Process:  Linear in her responses. Can present reasonable thought process and explanations, but her responses not consistent with staff or peer reports of her behavior when she was having interpersonal conflicts.  Continues to be confused about her visa status and hospitalization despite repeated explanations.  Associations:  no loose associations  Thought Content:  Delusions present, non-bizarre but picture is becoming more convoluted for the extent of her delusions.  No SI or hallucinations noted.   Insight:  very poor; though letter turned out to be real, poor insight into how unlikely it would be for President Yvette to write such a personal letter  Judgment:  poor  Oriented to:  time, person, and place  Attention Span and Concentration:  intact  Recent and Remote Memory:  limited  Language: limited English; uses Afghan   Fund of Knowledge: appropriate to level of education  Muscle Strength and Tone: normal  Gait and Station: Normal         Labs:   No results found for this or any previous visit (from the past 24 hour(s)).

## 2017-06-01 NOTE — PROGRESS NOTES
Pt was visible in the milieu, and attended groups. Pt continues to denies SI/SIB, hallucinations, and medication side affects. Pt spent time praying in her room and was social with peers.      06/01/17 1227   Behavioral Health   Hallucinations denies / not responding to hallucinations   Thinking distractable;poor concentration;delusional   Orientation person: oriented;place: oriented;date: oriented;time: oriented   Memory baseline memory   Insight poor   Judgement impaired   Eye Contact at examiner   Affect full range affect   Mood mood is calm   Physical Appearance/Attire attire appropriate to age and situation   Hygiene well groomed   Suicidality other (see comments)  (denies )   Self Injury other (see comment)  (denies)   Activity other (see comment)  (visible in the milieu )   Speech clear;coherent   Medication Sensitivity no observed side effects;no stated side effects   Psychomotor / Gait balanced;steady   Psycho Education   Type of Intervention 1:1 intervention   Response participates, initiates socially appropriate   Hours 0.5   Treatment Detail check in    Group Therapy Session   Group Attendance attended group session   Group Type community   Activities of Daily Living   Hygiene/Grooming independent   Oral Hygiene independent   Dress street clothes;independent   Laundry with supervision   Room Organization independent   Activity   Activity Level of Assistance independent

## 2017-06-02 ENCOUNTER — OFFICE VISIT (OUTPATIENT)
Dept: INTERPRETER SERVICES | Facility: CLINIC | Age: 47
End: 2017-06-02

## 2017-06-02 PROCEDURE — 99232 SBSQ HOSP IP/OBS MODERATE 35: CPT | Mod: GC | Performed by: PSYCHIATRY & NEUROLOGY

## 2017-06-02 PROCEDURE — 25000132 ZZH RX MED GY IP 250 OP 250 PS 637: Performed by: STUDENT IN AN ORGANIZED HEALTH CARE EDUCATION/TRAINING PROGRAM

## 2017-06-02 PROCEDURE — 90853 GROUP PSYCHOTHERAPY: CPT

## 2017-06-02 PROCEDURE — 12400001 ZZH R&B MH UMMC

## 2017-06-02 PROCEDURE — 97150 GROUP THERAPEUTIC PROCEDURES: CPT | Mod: GO

## 2017-06-02 PROCEDURE — T1013 SIGN LANG/ORAL INTERPRETER: HCPCS | Mod: U3

## 2017-06-02 RX ORDER — HALOPERIDOL 5 MG/1
5 TABLET ORAL AT BEDTIME
Status: DISCONTINUED | OUTPATIENT
Start: 2017-06-02 | End: 2017-06-06

## 2017-06-02 RX ORDER — HALOPERIDOL 5 MG/ML
5 INJECTION INTRAMUSCULAR AT BEDTIME
Status: DISCONTINUED | OUTPATIENT
Start: 2017-06-02 | End: 2017-06-06

## 2017-06-02 RX ADMIN — HALOPERIDOL 5 MG: 5 TABLET ORAL at 22:17

## 2017-06-02 ASSESSMENT — ACTIVITIES OF DAILY LIVING (ADL): GROOMING: INDEPENDENT

## 2017-06-02 NOTE — PROGRESS NOTES
06/02/17 1300   Behavioral Health   Hallucinations denies / not responding to hallucinations   Thinking distractable   Memory baseline memory   Insight poor   Judgement impaired   Affect full range affect   Mood mood is calm   Physical Appearance/Attire attire appropriate to age and situation   Hygiene well groomed   Suicidality other (see comments)   Self Injury other (see comment)   Activity (participates)   Speech coherent;clear   Medication Sensitivity no observed side effects;no stated side effects   Psychomotor / Gait balanced;steady     Attends groups. Pleasant. No apparent hallucinations. No stated strange thoughts.

## 2017-06-02 NOTE — PROGRESS NOTES
----------------------------------------------------------------------------------------------------------  [facility], [place]   Psychiatric Progress Note     Assessment    Presentation: Hilda Delgadillo is a 46 year old female with a history of unspecified chronic psychosis who presented from the [facility] after she became agitated and paranoid, refusing to get on the plane and saying her family manipulated her.  She takes risperidone, but has been off of it for 4 days according to her son.    Diagnostic Impression: Hilda Delgadillo is a 46 year old female with a history of unspecified chronic psychosis who was brought in by ambulance by family request as she became more agitated and paranoid while at the [facility] and refused to board the plane to return to her home in [place].  Her MSE is notable for agitation with poor insight and paranoia.  She denies substance abuse.  She was diagnosed with unspecified chronic psychosis in 2015.  She takes risperidone, which family says she takes disguised in drinks and food, but stopped taking her risperidone 4 days ago and told staff that she does not take any medications.   History and presentation of symptoms are consistent with diagnosis of chronic psychosis, likely late onset paranoid schizophrenia during menopausal transition, with decompensation due to cessation of anti-psychotics.    Hospital course: Hilda Delgadillo was admitted to station 22 on a 72 hour hold and placed on status 15.  She refused to take risperidone because she denied ever being on risperidone or having mental health issues.  The risperidone prescription bottle was listed for a name similar to Hilda's and the prescription came from [facility], which was also listed her under under a different name.  However, family verified [facility] hospitalization, and this patient fit the same description as Hilda for presentation of symptoms, paranoia, and references to  Yvette.   At [facility], she referred to [facility] as her primary support system.    [facility] tried to commit her, but it was not supported by the [facility].  Due to second hospitalization within two months, not having a home to return to, no insight, and refusing to take medications, commitment with clement was filed for her.  She was switched over from risperidone to haloperidol with plans to eventually switch her to IM haldol decanoate (CASTELLON).  Family would visit her, but she was very argumentative and agitated during each visit; she continued to deny the need for medication despite persistent delusions during hospitalization.   [facility] supported petition for committment and patient was placed on a court hold.  She intermittently had more aggressive behavior with staff and other peers, and did not respond to redirection or would ignore it. Eventually, she was given a single room. She reported at times a special relationship with Alison Crawford, e.g., he had sent her a personal letter telling her she could stay in the [place], or sent her flowers; at other times, she denied saying or believing these ideas.  Patient had 2 hearings; she was committed and jarvised.  Behavior code was called on the first day patient was required to take haldol, but IM was not needed as she agreed to take PO.  Patient continued to take scheduled haldol without any side effects.  Five days after commitment was place, staff learned that family had written letters to patient pretending to be President [place]; patient was able to produce letter that told her President Yvette wanted to meet her in [place].  Patient received this letter after her hospitalization in [place]; however, [facility] documentation does describe delusions about [place].  This letter had been missing since beginning of present admission but was brought to the facility by her family and first shown to staff by patient 2 days after she was committed.  Team spoke to risk management and  spoke to the court with the new evidence.  The  said the  and  would review the new evidence, but as of now, there was enough evidence to uphold the commitment and vaughan.  Patient had little insight in her mental illness as well as poor insight as to what would be involved if she lived in the [place] without family support.    Medical course: None.    Plan     Principal Diagnosis: Psychosis NEC  Eval for schizophrenia vs schizoaffective d/o    Medications:   -haloperidol 5 mg PO/IM QHS  -haloperidol 2 mg PO q2h PRN for agitation or haloperidol 5 mg IM q2h PRN for agitation  -benztropine 1 mg PO BID PRN for EPSE   -benztropine 1 mg IM daily for acute dystonic reaction    Laboratory/Imaging: none new    Consults: none    Patient will be treated in therapeutic milieu with appropriate individual and group therapies as described.    Medical diagnoses to be addressed this admission:  None.    Relevant psychosocial stressors: chronic mental illness, poor medication compliance, arguments with family    Legal Status: Commitment with [facility]an    Safety Assessment:   Checks: Status 15  Precautions: Suicide, SIB, single room  Pt has not required locked seclusion or restraints in the past 24 hours to maintain safety, please refer to RN documentation for further details.    The risks, benefits, alternatives and side effects have been discussed and are understood by the patient and other caregivers.    Anticipated Disposition/Discharge Date: Pending medication adjustment and stabilization.     Patient seen and discussed with attending psychiatrist Eyal Hand MD, who agrees with my assessment and plan.    Albert Shanks PGY-1  pg [phone]   3:23 PM 06/02/2017     Psychiatry Attending Attestation:  This patient has been seen and evaluated by me, Eyal Hand M.D.  The patient's condition and treatment plan were discussed with the resident, and care coordinated with the CTC and RN. I  "reviewed, edited and agree with the findings and plan in this note.     Eyal Hand M.D.   of Psychiatry     Interim History:   The patient's care was discussed with the treatment team and chart notes were reviewed. Staff report patient participated in community meetings and was visible on the milieu.  Sleep: 5.5 hours  PRN's: None    On interview, patient reports that her mood is good. She feels physically well and notes no medication side effects, including no sedation, no muscle stiffness, no lightheadedness. Patient asks who in her family created the fake letter from [place] and why they would do this. Team agreed that it would be helpful to schedule a family meeting to discuss their motivations for trying to get patient to move to [place] but not to identify the creator of the letter in question.    Review of systems:     The Review of Systems is negative other than noted above.         Medications:   Scheduled  Current Facility-Administered Medications   Medication Dose Route Frequency     haloperidol  5 mg Oral At Bedtime    Or     haloperidol lactate  5 mg Intramuscular At Bedtime     PRN  Current Facility-Administered Medications   Medication Dose Route Frequency     benztropine  1 mg Oral BID PRN     benztropine mesylate  1 mg Intramuscular BID PRN     haloperidol lactate  5 mg Intramuscular Q2H PRN    Or     haloperidol  2 mg Oral Q2H PRN     acetaminophen  650 mg Oral Q4H PRN          Allergies:   No Known Allergies         Psychiatric Examination:   /62  Pulse 66  Temp 97.8  F (36.6  C) (Tympanic)  Resp 16  Ht 1.702 m (5' 7\")  Wt 78.2 kg (172 lb 8 oz)  BMI 27.02 kg/m2  Weight is 172 lbs 8 oz  Body mass index is 27.02 kg/(m^2).    Appearance:  appeared as age stated, well groomed, wearing hijab and dress.   Attitude:  cooperative and guarded  Eye Contact:  good  Mood:  \"good\"  Affect:  appropriate and in normal range and full range  Speech:  clear, coherent and normal " prosody  Psychomotor Behavior:  no evidence of tardive dyskinesia, dystonia, or tics  Thought Process:  Linear, goal-oriented, illogical  Associations:  no loose associations  Thought Content:  + delusion, improving. No SI/HI. No AH/VH  Insight:  poor; though letter turned out to be real, poor insight into how unlikely it would be for President Yvette to write such a personal letter  Judgment:  poor  Oriented to:  time, person, and place  Attention Span and Concentration:  intact  Recent and Remote Memory:  limited  Language: limited English; uses "[employer]   Fund of Knowledge: appropriate to level of education  Muscle Strength and Tone: normal  Gait and Station: Normal         Labs:   No results found for this or any previous visit (from the past 24 hour(s)).

## 2017-06-02 NOTE — PROGRESS NOTES
Focused on structured art project which she stated she was planning on giving to [facility].  When asked to chose music she would like to hear on ipad she consistently chooses to listen to speeches from [facility] instead.  Denies needing to be in the hospital and does not know why she's here.

## 2017-06-02 NOTE — PROGRESS NOTES
"PROGRESS NOTE SUPPLEMENT:Email communication from Jarrod Vega :      From: Catherine Nicholson <[email or web address]>  Subject: Hilda Delgadillo (Case No. [id number]  Date: June 1, 2017 at 3:57:55 PM CDT  To: \"Lien, Lennox\" <[email or web address]>, \"[email or web address]\" <[email or web address]>, \"[email or web address]\" <[email or web address]>  Cc: Sandy Powell <[email or web address]>, \"[email or web address]\" <[email or web address]>    Good Afternoon:    I am communicating to all of you regarding a matter involving Respondent Hilda Delgadillo (Case No. [id number]), who was previously Committed to the [facility] and the Commissioner of Human Services by Order dated May 25, 2017.  An Order Authorizing Use of Neuroleptic Medication was also issued by the Court on that same date.  Ms. Delgadillo, who resides in [place] but was visiting family members in [place], was diagnosed with Schizophrenia vs. Bipolar Disorder, with Psychosis by the Dr. Mitchell Arora, the Court-Appointed Examiner, as well as the Psychiatric Team at [facility].    Yesterday afternoon, Dr. Eyal Hand from Ms. Delgadillo's Psychiatric Team at [facility] contacted the 's Office to inform us that they had just recently obtained new information that led them to believe that collaborative information collected from Ms. Delgadillo's family members is partially false.  Specifically, Dr. Hand indicated that Ms. Delgadillo produced a type-written letter on plain white paper with a presidential seal that purportedly was from Mino Crawford.  It stated something to the effect that Mino Crawford was thankful to Ms. Delgadillo for her support, that Mino and Steffany Crawford were going to be visiting the [place] in [place] and would like to meet with Ms. Delgadillo while vacationing there.  Dr. Hand noted that the letter was an obvious fake-that the presidential seal was amateurish and the letter contained " "grammatical or spelling errors.  The general belief is that one or more of Ms. Delgadillo's family members likely created this letter in order to persuade her to return to the [place]    Upon reviewing the records, it is clear that Ms. Delgadillo has expressed delusional thoughts about Mino Crawford at different times to different people other than family members, including that she was talking and texting with him; that he was her primary support system; during a discussion group she stated that Alison Crawford had given her \"gifts, love, flowers\", that he had sent her a personal letter telling her that she could stay in the [place] and that he would help her stay in the [place].  References of Ms. Delgadillo's preoccupation and delusions appear throughout the medical records from both [facility] and [facility].  As such, it is the 's position that the fact that this purported letter from Alison Crawford to Ms. Delgadillo had been produced does not negate the Orders issued by the Court determining that she is in fact mentally ill and in need of neuroleptic medications since those Orders are based on many more factors than the existence of this one letter.  Nonetheless, we felt it imperative to fully disclose the information received from Dr. Hand so that Court,  and the Court-Appointed Examiner are all aware of it.    Kind regards,      Catherine Nicholson  Assistant [employer] [place] Attorney  [phone]    This note reviewed and discussed with the team.    Robbi Hand MD  Attending Psychiatrist      "

## 2017-06-02 NOTE — PLAN OF CARE
Problem: Psychotic Symptoms  Goal: Psychotic Symptoms  Signs and symptoms of listed problems will be absent or manageable.  -pt will verbalize coping skills to manage hallucinations  -pt will participate in groups demonstrating adequate attention span  -pt will identify when or if they are having any medication side effects  -pt will be able to have reality based conversations   Outcome: Improving  Has been visible in milieu. Attended community meeting and participated. She denies any mental health issues. Took oral medication at bedtime. Still fasting for Ramadan. Meal was brought in by peer's family and saved for her.

## 2017-06-02 NOTE — PROGRESS NOTES
Behavioral Health  Note   Behavioral Health  Spirituality Group Note     Unit 22    Name: Hilda Delgadillo    YOB: 1970   MRN: [id number]    Age: 46 year old     Patient attended -led group, which included discussion of spirituality, coping with illness and building resilience.   Patient attended group for 1.0 hrs.   patient minimally participated, but was respectful to the group process.     Christine [facility]  Staff    Page [phone]

## 2017-06-02 NOTE — PLAN OF CARE
Problem: General Plan of Care (Inpatient Behavioral)  Goal: Team Discussion  Team Plan:   BEHAVIORAL TEAM DISCUSSION      Participants: Mercedes LEÓN, Latricia Varags RN,  Dr. Eyal Hand MD, Dr. Albert Shanks MD  Progress: Patient is less  Continued Stay Criteria/Rationale: Patient was recently placed on commitment and frey. Patient has recently been started on medication to help improve her psychotic and delusional thinking.   Medical/Physical: No physical issues on record  Precautions:   Behavioral Orders   Procedures     Code 1 - Restrict to Unit     Routine Programming       As clinically indicated     Self Injury Precaution     Status 15       Every 15 minutes.     Suicide precautions     Plan: Continue on medication from [facility] order to help improve insight and psychotic thinking.   Rationale for change in precautions or plan: There is little for changes to plan as patient has not stabilized.

## 2017-06-02 NOTE — PROGRESS NOTES
Radha Lynch ([phone]) is the [facility]  that has been assigned to patient and she will come out Wed. 6/7 at 10am to meet patient.

## 2017-06-03 PROCEDURE — 97150 GROUP THERAPEUTIC PROCEDURES: CPT | Mod: GO

## 2017-06-03 PROCEDURE — 12400001 ZZH R&B MH UMMC

## 2017-06-03 PROCEDURE — 25000132 ZZH RX MED GY IP 250 OP 250 PS 637: Performed by: STUDENT IN AN ORGANIZED HEALTH CARE EDUCATION/TRAINING PROGRAM

## 2017-06-03 RX ADMIN — HALOPERIDOL 5 MG: 5 TABLET ORAL at 22:18

## 2017-06-03 ASSESSMENT — ACTIVITIES OF DAILY LIVING (ADL)
GROOMING: INDEPENDENT
ORAL_HYGIENE: INDEPENDENT
LAUNDRY: WITH SUPERVISION
DRESS: INDEPENDENT

## 2017-06-03 NOTE — PLAN OF CARE
"Problem: Psychotic Symptoms  Goal: Psychotic Symptoms  Signs and symptoms of listed problems will be absent or manageable.  -pt will verbalize coping skills to manage hallucinations  -pt will participate in groups demonstrating adequate attention span  -pt will identify when or if they are having any medication side effects  -pt will be able to have reality based conversations   Hilda spoke with  here to psychPetty Leal.  Hilda's main concern is that this is the season of Ramadan and she fasts until dark.  She is given a special meal but due to unit policy there is no way to provide it to her warmed up.  She is dissatisfied with this.  The meal is then not pleasant and after she has fasted all day she is hungry.   Later, without an , Hilda worked hard to communicate and was successful.  She does want an  involved with discussion of the Haldol, why that choice, why that medicine, why forced, what will it do, what to expect.  \"Right now it makes me tired.  I don't notice any difference in my thinking.  It was always clear.  My sister, son (and named another relative like an Aunt or Uncle) exaggerate or lie.  I don't know why they all come together with that story.  It is not true.  I don't know why they would do that.\". There is no hint of deception in her speech or manner.  She completely seems to beliefe that what she is saying is true.  She said that she is not now sad, but when she first came to the hospital she was sad.  She is not anxious right now but she did feel a lot of anxiety related to an event \"they made me take the medicine.  They said I had to.\". \"I would like to know more about this.  (talk about this more with an  present).\". Her bearing and manner are stately, her language is flowing and descriptive.  \"God will not give me anything that it too much for me.\".  When asked she said, \"No, I don't get headaches or anything from not eating.  If It made " "me weak, I am not compelled (in Rastafarian) to do what is too much for me.  I feel fine and want to fast.\". \"I can accept even the (cold food) if that is how it is.\".        "

## 2017-06-04 PROCEDURE — T1013 SIGN LANG/ORAL INTERPRETER: HCPCS | Mod: U3

## 2017-06-04 PROCEDURE — 25000132 ZZH RX MED GY IP 250 OP 250 PS 637: Performed by: STUDENT IN AN ORGANIZED HEALTH CARE EDUCATION/TRAINING PROGRAM

## 2017-06-04 PROCEDURE — 12400001 ZZH R&B MH UMMC

## 2017-06-04 RX ADMIN — HALOPERIDOL 5 MG: 5 TABLET ORAL at 22:09

## 2017-06-04 ASSESSMENT — ACTIVITIES OF DAILY LIVING (ADL)
ORAL_HYGIENE: INDEPENDENT
DRESS: INDEPENDENT
LAUNDRY: WITH SUPERVISION
GROOMING: HANDWASHING;SHOWER;INDEPENDENT
GROOMING: INDEPENDENT
LAUNDRY: WITH SUPERVISION
ORAL_HYGIENE: INDEPENDENT
DRESS: STREET CLOTHES;INDEPENDENT

## 2017-06-04 NOTE — PLAN OF CARE
Problem: Psychotic Symptoms  Goal: Psychotic Symptoms  Signs and symptoms of listed problems will be absent or manageable.  -pt will verbalize coping skills to manage hallucinations  -pt will participate in groups demonstrating adequate attention span  -pt will identify when or if they are having any medication side effects  -pt will be able to have reality based conversations   Hilda, with , asked questions about Haldol.  She is adamant that she is not lying and that her family is.  She stridently denied being delusional.  It was discussed that she was 100% honest, but that her brain might be playing tricks on her regarding facts that seem real but are not. She accepted conversation but said that was not happening with her.  She appreciated that we believed her to be honest, reporting what her brain said was real sincerely.  She still firmly believes that the facts in her mind are based on reality.

## 2017-06-04 NOTE — PLAN OF CARE
Problem: Psychotic Symptoms  Goal: Psychotic Symptoms  Signs and symptoms of listed problems will be absent or manageable.  -pt will verbalize coping skills to manage hallucinations  -pt will participate in groups demonstrating adequate attention span  -pt will identify when or if they are having any medication side effects  -pt will be able to have reality based conversations   Outcome: Improving  Patient out in milieu. She is quietly social. Less guarded. She is med compliant. Observing Ramadan. Valerie Peña RN

## 2017-06-04 NOTE — PROGRESS NOTES
Pt present in milieu, watching movies, social with peers, did not eat meals as pt is observing Ramadan. Pt says her mood is a 8-9 out of 10.      06/04/17 1500   Behavioral Health   Hallucinations denies / not responding to hallucinations   Thinking intact   Orientation place: oriented;date: oriented;person: oriented   Memory baseline memory   Insight poor   Judgement impaired   Eye Contact at examiner   Affect full range affect   Mood mood is calm   Physical Appearance/Attire attire appropriate to age and situation   Hygiene well groomed   Suicidality other (see comments)  (denies)   Self Injury other (see comment)  (denies)   Activity other (see comment)  (present and alert in milieu)   Speech clear;coherent   Medication Sensitivity no stated side effects;no observed side effects   Psychomotor / Gait balanced;steady   Psycho Education   Type of Intervention 1:1 intervention   Response participates, initiates socially appropriate   Hours 0.5   Treatment Detail check in   Activities of Daily Living   Hygiene/Grooming independent   Oral Hygiene independent   Dress independent   Laundry with supervision   Room Organization independent

## 2017-06-05 PROCEDURE — T1013 SIGN LANG/ORAL INTERPRETER: HCPCS | Mod: U3

## 2017-06-05 PROCEDURE — 97150 GROUP THERAPEUTIC PROCEDURES: CPT | Mod: GO

## 2017-06-05 PROCEDURE — 99232 SBSQ HOSP IP/OBS MODERATE 35: CPT | Mod: GC | Performed by: PSYCHIATRY & NEUROLOGY

## 2017-06-05 PROCEDURE — 25000128 H RX IP 250 OP 636: Performed by: STUDENT IN AN ORGANIZED HEALTH CARE EDUCATION/TRAINING PROGRAM

## 2017-06-05 PROCEDURE — 25000132 ZZH RX MED GY IP 250 OP 250 PS 637: Performed by: STUDENT IN AN ORGANIZED HEALTH CARE EDUCATION/TRAINING PROGRAM

## 2017-06-05 PROCEDURE — 12400001 ZZH R&B MH UMMC

## 2017-06-05 PROCEDURE — 90853 GROUP PSYCHOTHERAPY: CPT

## 2017-06-05 RX ORDER — HALOPERIDOL DECANOATE 50 MG/ML
100 INJECTION INTRAMUSCULAR ONCE
Status: COMPLETED | OUTPATIENT
Start: 2017-06-05 | End: 2017-06-05

## 2017-06-05 RX ADMIN — HALOPERIDOL DECANOATE 100 MG: 100 INJECTION INTRAMUSCULAR at 22:15

## 2017-06-05 RX ADMIN — HALOPERIDOL 5 MG: 5 TABLET ORAL at 22:13

## 2017-06-05 ASSESSMENT — ACTIVITIES OF DAILY LIVING (ADL)
ORAL_HYGIENE: INDEPENDENT
LAUNDRY: WITH SUPERVISION
GROOMING: INDEPENDENT
ORAL_HYGIENE: INDEPENDENT
GROOMING: HANDWASHING;SHOWER;INDEPENDENT
LAUNDRY: WITH SUPERVISION
DRESS: INDEPENDENT
DRESS: STREET CLOTHES;INDEPENDENT

## 2017-06-05 NOTE — PLAN OF CARE
Problem: Psychotic Symptoms  Goal: Psychotic Symptoms  Signs and symptoms of listed problems will be absent or manageable.  -pt will verbalize coping skills to manage hallucinations  -pt will participate in groups demonstrating adequate attention span  -pt will identify when or if they are having any medication side effects  -pt will be able to have reality based conversations   Hilda had many questions about the Haldol decanoate IM.  She would like to talk to the nurse when the  is here this evening to understand about this mandatory long acting medication.  Dr. Shanks notified and agreed.

## 2017-06-05 NOTE — PROGRESS NOTES
Pt present and social in milieu, attended groups, did not eat meals in observance of Ramadan, well groomed.      06/05/17 1400   Behavioral Health   Hallucinations denies / not responding to hallucinations   Thinking intact   Orientation person: oriented;place: oriented;date: oriented   Memory baseline memory   Insight poor   Judgement impaired   Eye Contact at examiner   Affect full range affect   Mood mood is calm   Physical Appearance/Attire attire appropriate to age and situation   Hygiene well groomed   Suicidality other (see comments)  (denies)   Self Injury other (see comment)  (denies)   Activity other (see comment)  (present in milieu)   Speech coherent;clear   Medication Sensitivity no observed side effects;no stated side effects   Psychomotor / Gait balanced;steady   Psycho Education   Type of Intervention structured groups   Response participates, initiates socially appropriate   Hours 0.5   Treatment Detail check-in   Activities of Daily Living   Hygiene/Grooming independent   Oral Hygiene independent   Dress independent   Laundry with supervision   Room Organization independent   Activity   Activity Level of Assistance independent

## 2017-06-05 NOTE — PLAN OF CARE
Problem: Psychotic Symptoms  Goal: Psychotic Symptoms  Signs and symptoms of listed problems will be absent or manageable.  -pt will verbalize coping skills to manage hallucinations  -pt will participate in groups demonstrating adequate attention span  -pt will identify when or if they are having any medication side effects  -pt will be able to have reality based conversations   Outcome: Zaid Stevens has been visible in [facility]. Her son visited earlier. She says that he tells her every day that he either is going back to [place], or that he has gone to [place]. Yet he is still here. She waved her hand in a dismissive gesture (referring to her son.) She has been cooperative with medications. Denies any mental health issues.

## 2017-06-05 NOTE — PROGRESS NOTES
----------------------------------------------------------------------------------------------------------  [facility], [place]   Psychiatric Progress Note     Assessment    Presentation: Hilda Delgadillo is a 46 year old female with a history of unspecified chronic psychosis who presented from the [facility] after she became agitated and paranoid, refusing to get on the plane and saying her family manipulated her.  She takes risperidone, but has been off of it for 4 days according to her son.    Diagnostic Impression: Hilda Delgadillo is a 46 year old female with a history of unspecified chronic psychosis who was brought in by ambulance by family request as she became more agitated and paranoid while at the [facility] and refused to board the plane to return to her home in [place].  Her MSE is notable for agitation with poor insight and paranoia.  She denies substance abuse.  She was diagnosed with unspecified chronic psychosis in 2015.  She takes risperidone, which family says she takes disguised in drinks and food, but stopped taking her risperidone 4 days ago and told staff that she does not take any medications.   History and presentation of symptoms are consistent with diagnosis of chronic psychosis, likely late onset paranoid schizophrenia during menopausal transition, with decompensation due to cessation of anti-psychotics.    Hospital course: Hilda Delgadillo was admitted to station 22 on a 72 hour hold and placed on status 15.  She refused to take risperidone because she denied ever being on risperidone or having mental health issues.  The risperidone prescription bottle was listed for a name similar to Hilda's and the prescription came from [place], which was also listed her under under a different name.  However, family verified [place] hospitalization, and this patient fit the same description as Hilda for presentation of symptoms, paranoia, and references to  Yvette.   At [facility], she referred to [facility] as her primary support system.    [place] tried to commit her, but it was not supported by the [facility].  Due to second hospitalization within two months, not having a home to return to, no insight, and refusing to take medications, commitment with clement was filed for her.  She was switched over from risperidone to haloperidol with plans to eventually switch her to IM haldol decanoate (CASTELLON).  Family would visit her, but she was very argumentative and agitated during each visit; she continued to deny the need for medication despite persistent delusions during hospitalization.   [facility] supported petition for committment and patient was placed on a court hold.  She intermittently had more aggressive behavior with staff and other peers, and did not respond to redirection or would ignore it. Eventually, she was given a single room. She reported at times a special relationship with Alison Crawford, e.g., he had sent her a personal letter telling her she could stay in the [place], or sent her flowers; at other times, she denied saying or believing these ideas.  Patient had 2 hearings; she was committed and jarvised.  Behavior code was called on the first day patient was required to take haldol, but IM was not needed as she agreed to take PO.  Patient continued to take scheduled haldol without any side effects.  Five days after commitment was place, staff learned that family had written letters to patient pretending to be President Yvette; patient was able to produce letter that told her President Yvette wanted to meet her in [place].  Patient received this letter after her hospitalization in [place]; however, [facility] documentation does describe delusions about [place].  This letter had been missing since beginning of present admission but was brought to the facility by her family and first shown to staff by patient 2 days after she was committed.  Team spoke to risk management and  spoke to the court with the new evidence.  The  said the  and  would review the new evidence, but as of now, there was enough evidence to uphold the commitment and frey.  Patient had little insight in her mental illness as well as poor insight as to what would be involved if she lived in the [place] without family support. Haldol transition to CASTELLON, first dose given 6/5/17.    Medical course: None.    Plan     Principal Diagnosis: Psychosis NEC  Eval for delusional d/o vs schizophrenia vs schizoaffective d/o    Medications:   -Haldol dec  mg once  -haloperidol 5 mg PO/IM QHS, last dose tonight  -haloperidol 2 mg PO q2h PRN for agitation or haloperidol 5 mg IM q2h PRN for agitation  -benztropine 1 mg PO BID PRN for EPSE   -benztropine 1 mg IM daily for acute dystonic reaction    Laboratory/Imaging: none new    Consults: none    Patient will be treated in therapeutic milieu with appropriate individual and group therapies as described.    Medical diagnoses to be addressed this admission:  None.    Relevant psychosocial stressors: chronic mental illness, poor medication compliance, arguments with family    Legal Status: Commitment with [facility]    Safety Assessment:   Checks: Status 15  Precautions: Suicide, SIB, single room  Pt has not required locked seclusion or restraints in the past 24 hours to maintain safety, please refer to RN documentation for further details.    The risks, benefits, alternatives and side effects have been discussed and are understood by the patient and other caregivers.    Anticipated Disposition/Discharge Date: Pending medication adjustment and stabilization.     Patient seen and discussed with attending psychiatrist Eyal Hand MD, who agrees with my assessment and plan.    Albert Shanks PGY-1  pg [phone]   4:42 PM 06/05/2017     Psychiatry Attending Attestation:  This patient has been seen and evaluated by me, Eyal Hand M.D.  The  patient's condition and treatment plan were discussed with the resident, and care coordinated with the CTC and RN. I reviewed, edited and agree with the findings and plan in this note.    40 minute family meeting which was devoted 100% to discharge planning and counseling on helping patient gain insight into her illness and her family's response to it     Eyal Hand M.D.   of Psychiatry   Interim History:   The patient's care was discussed with the treatment team and chart notes were reviewed. Staff report patient worked on an art project to give to [employer] and listened only to [employer] speeches when allowed to use the iPod.  Sleep: 5.5 hours  PRN's: None    Patient interviewed in conference room with team, family, and Austrian  present. Family shared motivations for creating the fake letter from [employer] to patient: their goal was to trick patient into moving back to [place], where she has greater family support and access to mental healthcare.  Patient shared that she didn't want to go back to [place] primarily because she fears for her safety and the safety of her son should they move back (patient's son was involved with a drug gang in [place]).  Team and family agreed that patient should live with her family in [place] at such a time as she leaves the hospital or aftercare facility. Patient has no side effects of Haldol, including no tremor, no muscle stiffness, and no lightheadedness.    Review of systems:     The Review of Systems is negative other than noted above.         Medications:   Scheduled  Current Facility-Administered Medications   Medication Dose Route Frequency     haloperidol decanoate  100 mg Intramuscular Once     haloperidol  5 mg Oral At Bedtime    Or     haloperidol lactate  5 mg Intramuscular At Bedtime     PRN  Current Facility-Administered Medications   Medication Dose Route Frequency     benztropine  1 mg Oral BID PRN     benztropine mesylate  1 mg  "Intramuscular BID PRN     haloperidol lactate  5 mg Intramuscular Q2H PRN    Or     haloperidol  2 mg Oral Q2H PRN     acetaminophen  650 mg Oral Q4H PRN          Allergies:   No Known Allergies         Psychiatric Examination:   /62  Pulse 66  Temp 97.3  F (36.3  C)  Resp 16  Ht 1.702 m (5' 7\")  Wt 78.9 kg (174 lb)  BMI 27.25 kg/m2  Weight is 174 lbs 0 oz  Body mass index is 27.25 kg/(m^2).    Appearance:  appeared as age stated, well groomed, wearing hijab and dress.   Attitude:  cooperative and guarded  Eye Contact:  good  Mood:  \"good\"  Affect:  intensity is normal and reactive  Speech:  clear, coherent and normal prosody  Psychomotor Behavior:  no evidence of tardive dyskinesia, dystonia, or tics  Thought Process:  Linear, goal-oriented, illogical  Associations:  no loose associations  Thought Content:  + Obama delusion, improving. No SI/HI. No AH/VH  Insight:  poor; though letter turned out to be real, poor insight into how unlikely it would be for President Yvette to write such a personal letter  Judgment:  poor  Oriented to:  time, person, and place  Attention Span and Concentration:  intact  Recent and Remote Memory:  limited  Language: limited English; uses [place]an   Fund of Knowledge: appropriate to level of education  Muscle Strength and Tone: normal  Gait and Station: Normal         Labs:   No results found for this or any previous visit (from the past 24 hour(s)).  "

## 2017-06-06 ENCOUNTER — OFFICE VISIT (OUTPATIENT)
Dept: INTERPRETER SERVICES | Facility: CLINIC | Age: 47
End: 2017-06-06

## 2017-06-06 PROCEDURE — 90853 GROUP PSYCHOTHERAPY: CPT

## 2017-06-06 PROCEDURE — 12400001 ZZH R&B MH UMMC

## 2017-06-06 PROCEDURE — T1013 SIGN LANG/ORAL INTERPRETER: HCPCS | Mod: U3

## 2017-06-06 PROCEDURE — 99232 SBSQ HOSP IP/OBS MODERATE 35: CPT | Mod: GC | Performed by: PSYCHIATRY & NEUROLOGY

## 2017-06-06 ASSESSMENT — ACTIVITIES OF DAILY LIVING (ADL)
HYGIENE/GROOMING: INDEPENDENT
ORAL_HYGIENE: INDEPENDENT
GROOMING: INDEPENDENT
DRESS: INDEPENDENT;STREET CLOTHES
DRESS: INDEPENDENT
ORAL_HYGIENE: INDEPENDENT
LAUNDRY: WITH SUPERVISION

## 2017-06-06 NOTE — PROGRESS NOTES
"Dameon attended three of three groups offered this date.  We discussed her family meeting yesterday which she said went OK, but she still feels they lie and are talking about her behind her back.  She's happy about discharge plan at the end of the week and stated she loves [place], the restaurants, the cline etc. \"[place].\"  "

## 2017-06-06 NOTE — PLAN OF CARE
Problem: General Plan of Care (Inpatient Behavioral)  Goal: Team Discussion  Team Plan:   BEHAVIORAL TEAM DISCUSSION     Participants: Mercedes Romero [facility],Dr. Eyal Hand MD, Dr. Albert Shanks MD,  Ashleigh Horta OT  Progress: Continues to have delusional thoughts but is able to question there validity.   Continued Stay Criteria/Rationale: Continues to have delusional thoughts regarding President Obama.  Medical/Physical: No physical concerns   Precautions:   Behavioral Orders   Procedures     Code 1 - Restrict to Unit     Routine Programming       As clinically indicated     Self Injury Precaution     Status 15       Every 15 minutes.     Suicide precautions     Plan: Patient will discharge to family in [place] after starting the injectable medication for increased medication compliance.   Rationale for change in precautions or plan: Patient previously had paranoia regarding family and could refuse to discharge with them.

## 2017-06-06 NOTE — PROGRESS NOTES
----------------------------------------------------------------------------------------------------------  [facility], [place]   Psychiatric Progress Note     Assessment    Presentation: Hilda Delgadillo is a 46 year old female with a history of unspecified chronic psychosis who [facility] after she became agitated and paranoid, refusing to get on the plane and saying her family manipulated her.  She takes risperidone, but has been off of it for 4 days according to her son.    Diagnostic Impression: Hilda Delgadillo is a 46 year old female with a history of unspecified chronic psychosis who was brought in by ambulance by family request as she became more agitated and paranoid while at the [facility] and refused to board the plane to return to her home in [place].  Her MSE is notable for agitation with poor insight and paranoia.  She denies substance abuse.  She was diagnosed with unspecified chronic psychosis in 2015.  She takes risperidone, which family says she takes disguised in drinks and food, but stopped taking her risperidone 4 days ago and told staff that she does not take any medications.   History and presentation of symptoms are consistent with diagnosis of chronic psychosis, likely late onset paranoid schizophrenia during menopausal transition, with decompensation due to cessation of anti-psychotics.    Hospital course: Hilda Delgadillo was admitted to station 22 on a 72 hour hold and placed on status 15.  She refused to take risperidone because she denied ever being on risperidone or having mental health issues.  The risperidone prescription bottle was listed for a name similar to Hilda's and the prescription came from [place], which was also listed her under under a different name.  However, family verified [place] hospitalization, and this patient fit the same description as Hilda for presentation of symptoms, paranoia, and references to  Yvette.   At [facility], she referred to [facility] as her primary support system.    [place] tried to commit her, but it was not supported by the [facility].  Due to second hospitalization within two months, not having a home to return to, no insight, and refusing to take medications, commitment with clement was filed for her.  She was switched over from risperidone to haloperidol with plans to eventually switch her to IM haldol decanoate (CASTELLON).  Family would visit her, but she was very argumentative and agitated during each visit; she continued to deny the need for medication despite persistent delusions during hospitalization.   [facility] supported petition for committment and patient was placed on a court hold.  She intermittently had more aggressive behavior with staff and other peers, and did not respond to redirection or would ignore it. Eventually, she was given a single room. She reported at times a special relationship with Alison Crawford, e.g., he had sent her a personal letter telling her she could stay in the [place], or sent her flowers; at other times, she denied saying or believing these ideas.  Patient had 2 hearings; she was committed and jarvised.  Behavior code was called on the first day patient was required to take haldol, but IM was not needed as she agreed to take PO.  Patient continued to take scheduled haldol without any side effects.  Five days after commitment was place, staff learned that family had written letters to patient pretending to be President Yvette; patient was able to produce letter that told her President Yvette wanted to meet her in [place].  Patient received this letter after her hospitalization in [place]; however, [facility] documentation does describe delusions about [place].  This letter had been missing since beginning of present admission but was brought to the facility by her family and first shown to staff by patient 2 days after she was committed.  Team spoke to risk management and  spoke to the court with the new evidence.  The  said the  and  would review the new evidence, but as of now, there was enough evidence to uphold the commitment and frey.  Patient had little insight in her mental illness as well as poor insight as to what would be involved if she lived in the [place] without family support. Haldol transitioned to CASTELLON, first dose given 6/5/17. Patient reported neck stiffness, but exam showed no increased tone of the neck. Patient does exhibit mild cogwheeling of the b/l upper extremities.    Plan     Principal Diagnosis: Psychosis NEC  Eval for delusional d/o vs schizophrenia vs schizoaffective d/o    Medications:   - s/p loading dose of Haldol dec 6/5  -haloperidol 2 mg PO q2h PRN for agitation or haloperidol 5 mg IM q2h PRN for agitation  -benztropine 1 mg PO BID PRN for EPSE   -benztropine 1 mg IM daily for acute dystonic reaction    Laboratory/Imaging: none new    Consults: none    Patient will be treated in therapeutic milieu with appropriate individual and group therapies as described.    Medical diagnoses to be addressed this admission:  None.    Relevant psychosocial stressors: chronic mental illness, poor medication compliance, arguments with family    Legal Status: Commitment with [facility]    Safety Assessment:   Checks: Status 15  Precautions: Suicide, SIB, single room  Pt has not required locked seclusion or restraints in the past 24 hours to maintain safety, please refer to RN documentation for further details.    The risks, benefits, alternatives and side effects have been discussed and are understood by the patient and other caregivers.    Anticipated Disposition/Discharge Date: To home with family pending further monitoring after first Haldol dec injection, possible DC in 2-3 days.    Patient seen and discussed with attending psychiatrist Eyal Hand MD, who agrees with my assessment and plan.    Albert Shanks PGY-1  pg  "[phone]   4:22 PM 06/06/2017     Psychiatry Attending Attestation:  This patient has been seen and evaluated by me, Eyal Hand M.D.  The patient's condition and treatment plan were discussed with the resident, and care coordinated with the CTC and RN. I reviewed, edited and agree with the findings and plan in this note.     Eyal Hand M.D.   of Psychiatry   Interim History:   The patient's care was discussed with the treatment team and chart notes were reviewed. Staff report patient had numerous questions regarding Haldol dec injection but was compliant with administration. OT reports that patient is often focused on Obama during group sessions but talks only of her admiration of him, and does not allude to any relationship with him.  Sleep: 6 hours  PRN's: None    Patient interviewed in conference room with Ethiopian . On interview, patient reports that her neck is stiff and painful. This began last night and has been continuous in intensity ever since. She has no trouble breathing, and no stiffness/cramps anywhere else in her body.  She otherwise feels well. She plans to live with family in [place] after discharge from the hospital.    Review of systems:     The Review of Systems is negative other than noted above.         Medications:   Scheduled  Current Facility-Administered Medications   Medication Dose Route Frequency     PRN  Current Facility-Administered Medications   Medication Dose Route Frequency     benztropine  1 mg Oral BID PRN     benztropine mesylate  1 mg Intramuscular BID PRN     haloperidol lactate  5 mg Intramuscular Q2H PRN    Or     haloperidol  2 mg Oral Q2H PRN     acetaminophen  650 mg Oral Q4H PRN          Allergies:   No Known Allergies         Psychiatric Examination:   /62  Pulse 82  Temp 96.2  F (35.7  C) (Tympanic)  Resp 16  Ht 1.702 m (5' 7\")  Wt 78 kg (172 lb)  BMI 26.94 kg/m2  Weight is 172 lbs 0 oz  Body mass index is 26.94 " "kg/(m^2).    Appearance:  appeared as age stated, well groomed, wearing hijab and dress.   Attitude:  cooperative and guarded  Eye Contact:  good  Mood:  \"good\"  Affect:  intensity is normal and reactive  Speech:  clear, coherent and normal prosody  Psychomotor Behavior:  no evidence of tardive dyskinesia, dystonia, or tics  Thought Process:  Linear, goal-oriented  Associations:  no loose associations  Thought Content:  + Obama delusion, less fixed and with less emotional salience. No SI/HI. No AH/VH  Insight:  Poor, improving  Judgment:  fair, improving  Oriented to:  time, person, and place  Attention Span and Concentration:  intact  Recent and Remote Memory:  limited  Language: limited English; uses [employer]   Fund of Knowledge: appropriate to level of education  Muscle Strength and Tone: tone normal in neck and extremities, mild cogwheeling in b/l u/e  Gait and Station: Normal         Labs:   No results found for this or any previous visit (from the past 24 hour(s)).  "

## 2017-06-06 NOTE — PLAN OF CARE
"Problem: Psychotic Symptoms  Goal: Psychotic Symptoms  Signs and symptoms of listed problems will be absent or manageable.  -pt will verbalize coping skills to manage hallucinations  -pt will participate in groups demonstrating adequate attention span  -pt will identify when or if they are having any medication side effects  -pt will be able to have reality based conversations   Outcome: Improving  Hilda met with this nurse and with interpretor to discuss Haldol decanoate. She had declined the shot earlier, stating that the effects and side effects of the drug had not been thoroughly explained to her. (Her family had been present at busy team gathering-probably distracting her.) As this nurse read through the patient teaching, Hilda had many questions about each point. It was explained that these are only possible results of taking the medicine and it was explained that we want her to have knowledge of what is possible and what to look for. She asked if she will become like the other people on the unit if she takes the drug? Explained to her that would not happen. She asked if she has to take the medicine, saying that she would rather wait until tomorrow when she talks to the doctors. This nurse explained that we have to give the medication tonight. She seems to accept this, though she again said \"I don't have mental illness.\"       "

## 2017-06-06 NOTE — PROGRESS NOTES
Active particip in OT groups. Isolative to self in the milieu mostly. Fasting due to Ramadan. Well groomed. Social with writer for short time.       06/06/17 3335   Behavioral Health   Hallucinations denies / not responding to hallucinations   Thinking intact   Orientation person: oriented;place: oriented;date: oriented;time: oriented   Memory baseline memory   Insight poor   Judgement impaired   Affect full range affect   Mood mood is calm   Physical Appearance/Attire attire appropriate to age and situation   Hygiene well groomed   Activity other (see comment)  (Active particip in groups)   Speech clear;other (see comments)  (Uses interpretor )   Psychomotor / Gait balanced;steady   Psycho Education   Type of Intervention structured groups   Response refuses   Activities of Daily Living   Hygiene/Grooming independent   Oral Hygiene independent   Dress independent;street clothes   Laundry with supervision   Room Organization independent

## 2017-06-07 PROCEDURE — 12400001 ZZH R&B MH UMMC

## 2017-06-07 PROCEDURE — 97150 GROUP THERAPEUTIC PROCEDURES: CPT | Mod: GO

## 2017-06-07 PROCEDURE — 99232 SBSQ HOSP IP/OBS MODERATE 35: CPT | Mod: GC | Performed by: PSYCHIATRY & NEUROLOGY

## 2017-06-07 PROCEDURE — 90853 GROUP PSYCHOTHERAPY: CPT

## 2017-06-07 ASSESSMENT — ACTIVITIES OF DAILY LIVING (ADL)
DRESS: INDEPENDENT
LAUNDRY: WITH SUPERVISION
DRESS: INDEPENDENT
ORAL_HYGIENE: INDEPENDENT
ORAL_HYGIENE: INDEPENDENT
LAUNDRY: WITH SUPERVISION
GROOMING: INDEPENDENT
GROOMING: INDEPENDENT

## 2017-06-07 NOTE — PROGRESS NOTES
Patient was visible in milieu, spent time watching tv in lounge, minimally social with peers. Affect was full range. Mood was calm. Patient's family member brought food in for her because pt is fasting for Ramadan. No SI, med side effects, or hallucinations noted.

## 2017-06-07 NOTE — PROGRESS NOTES
----------------------------------------------------------------------------------------------------------  [facility], [place]   Psychiatric Progress Note     Assessment    Presentation: Hilda Delgadillo is a 46 year old female with a history of unspecified chronic psychosis who presented from the [facility] [place] after she became agitated and paranoid, refusing to get on the plane and saying her family manipulated her.  She takes risperidone, but has been off of it for 4 days according to her son.    Diagnostic Impression: Hilda Delgadillo is a 46 year old female with a history of unspecified chronic psychosis who was brought in by ambulance by family request as she became more agitated and paranoid while at the [facility] and refused to board the plane to return to her home in [place].  Her MSE is notable for agitation with poor insight and paranoia.  She denies substance abuse.  She was diagnosed with unspecified chronic psychosis in 2015.  She takes risperidone, which family says she takes disguised in drinks and food, but stopped taking her risperidone 4 days ago and told staff that she does not take any medications.   History and presentation of symptoms are consistent with diagnosis of chronic psychosis, likely late onset paranoid schizophrenia during menopausal transition, with decompensation due to cessation of anti-psychotics.    Hospital course: Hilda Delgadillo was admitted to station 22 on a 72 hour hold and placed on status 15.  She refused to take risperidone because she denied ever being on risperidone or having mental health issues.  The risperidone prescription bottle was listed for a name similar to Hilda's and the prescription came from [facility], which was also listed her under under a different name.  However, family verified [facility] hospitalization, and this patient fit the same description as Hilda for presentation of symptoms, paranoia, and references to  Yvette.   At [facility], she referred to [facility] as her primary support system.    [place] tried to commit her, but it was not supported by the [facility].  Due to second hospitalization within two months, not having a home to return to, no insight, and refusing to take medications, commitment with clement was filed for her.  She was switched over from risperidone to haloperidol with plans to eventually switch her to IM haldol decanoate (CASTELLON).  Family would visit her, but she was very argumentative and agitated during each visit; she continued to deny the need for medication despite persistent delusions during hospitalization.   [facility] supported petition for committment and patient was placed on a court hold.  She intermittently had more aggressive behavior with staff and other peers, and did not respond to redirection or would ignore it. Eventually, she was given a single room. She reported at times a special relationship with Alison Crawford, e.g., he had sent her a personal letter telling her she could stay in the [place], or sent her flowers; at other times, she denied saying or believing these ideas.  Patient had 2 hearings; she was committed and jarvised.  Behavior code was called on the first day patient was required to take haldol, but IM was not needed as she agreed to take PO.  Patient continued to take scheduled haldol without any side effects.  Five days after commitment was place, staff learned that family had written letters to patient pretending to be President Yvette; patient was able to produce letter that told her President Yvette wanted to meet her in [place].  Patient received this letter after her hospitalization in [place]; however, [facility] documentation does describe delusions about [place].  This letter had been missing since beginning of present admission but was brought to the facility by her family and first shown to staff by patient 2 days after she was committed.  Team spoke to risk management and  spoke to the court with the new evidence.  The  said the  and  would review the new evidence, but as of now, there was enough evidence to uphold the commitment and vaughan.  Patient had little insight in her mental illness as well as poor insight as to what would be involved if she lived in the [place] without family support. Haldol transitioned to CASTELLON, first dose given 6/5/17. Patient reported neck stiffness, but exam showed no increased tone of the neck; neck stiffness subsequently resolved spontaneously. Patient does exhibit mild cogwheeling of the b/l upper extremities.    Plan     Principal Diagnosis: Psychosis NEC  Eval for delusional d/o vs schizophrenia vs schizoaffective d/o    Medications:   - s/p loading dose of Haldol dec 6/5  -haloperidol 2 mg PO q2h PRN for agitation or haloperidol 5 mg IM q2h PRN for agitation  -benztropine 1 mg PO BID PRN for EPSE   -benztropine 1 mg IM daily for acute dystonic reaction    Laboratory/Imaging: none new    Consults: none    Patient will be treated in therapeutic milieu with appropriate individual and group therapies as described.    Medical diagnoses to be addressed this admission:  None.    Relevant psychosocial stressors: chronic mental illness, poor medication compliance, arguments with family    Legal Status: Commitment with [place]    Safety Assessment:   Checks: Status 15  Precautions: Suicide, SIB, single room  Pt has not required locked seclusion or restraints in the past 24 hours to maintain safety, please refer to RN documentation for further details.    The risks, benefits, alternatives and side effects have been discussed and are understood by the patient and other caregivers.    Anticipated Disposition/Discharge Date: To home with family pending further monitoring after first Haldol dec injection, possible tomorrow.    Patient seen and discussed with attending psychiatrist Eyal Hand MD, who agrees with my assessment and  plan.    Albert Shanks PGY-1  pg [phone]   12:14 PM 06/07/2017     Psychiatry Attending Attestation:  This patient has been seen and evaluated by me, Eyal Hand M.D.  The patient's condition and treatment plan were discussed with the resident, and care coordinated with the CTC and RN. I reviewed, edited and agree with the findings and plan in this note.     Eyal Hand M.D.   of Psychiatry   Interim History:   The patient's care was discussed with the treatment team and chart notes were reviewed. Staff report patient was visible on the milieu but minimally social. Her family visited to bring food after sunset.  Sleep: 5.75 hours  PRN's: None    Patient interviewed in her room with Lexy . On interview, patient reports that her neck stiffness has resolved. She has no stiffness or cramping elsewhere in her body. She continues to have a mild tremor of the b/l hands that does not interfere with eating or drinking. She reports that she slept well and that her mood is good. She thinks about [place] 2-3 times per day and intends to attempt to contact him after she leaves the hospital by whichever means seem most accessible to her. She hopes that she will one day meet [place], but reports that she currently has no personal relationship with him.    Review of systems:     The Review of Systems is negative other than noted above.         Medications:   Scheduled  Current Facility-Administered Medications   Medication Dose Route Frequency     PRN  Current Facility-Administered Medications   Medication Dose Route Frequency     benztropine  1 mg Oral BID PRN     benztropine mesylate  1 mg Intramuscular BID PRN     haloperidol lactate  5 mg Intramuscular Q2H PRN    Or     haloperidol  2 mg Oral Q2H PRN     acetaminophen  650 mg Oral Q4H PRN          Allergies:   No Known Allergies         Psychiatric Examination:   /62  Pulse 79  Temp 98  F (36.7  C) (Tympanic)  Resp 16  Ht  "1.702 m (5' 7\")  Wt 78 kg (172 lb)  BMI 26.94 kg/m2  Weight is 172 lbs 0 oz  Body mass index is 26.94 kg/(m^2).    Appearance:  appeared as age stated, well groomed, wearing hijab and dress.   Attitude:  cooperative  Eye Contact:  good  Mood:  \"good\"  Affect:  intensity is normal and reactive  Speech:  clear, coherent and normal prosody  Psychomotor Behavior:  no evidence of tardive dyskinesia, dystonia, or tics, +fine tremor of b/l hands  Thought Process:  Linear, goal-oriented  Associations:  no loose associations  Thought Content:  + Obama delusion, less fixed and with less emotional salience. No SI/HI. No AH/VH  Insight:  Poor, improving  Judgment:  fair, improving  Oriented to:  time, person, and place  Attention Span and Concentration:  intact  Recent and Remote Memory:  limited  Language: limited English; uses [employer]   Fund of Knowledge: appropriate to level of education  Muscle Strength and Tone: tone normal in neck and extremities, mild cogwheeling in b/l u/e  Gait and Station: Normal         Labs:   No results found for this or any previous visit (from the past 24 hour(s)).  "

## 2017-06-07 NOTE — PLAN OF CARE
Problem: Psychotic Symptoms  Goal: Psychotic Symptoms  Signs and symptoms of listed problems will be absent or manageable.  -pt will verbalize coping skills to manage hallucinations  -pt will participate in groups demonstrating adequate attention span  -pt will identify when or if they are having any medication side effects  -pt will be able to have reality based conversations   Outcome: Improving  Hilda continues to hope to meet Alison Crawford, but acknowledges he is not readily available for social interacton-spoke of need to rebuild relationship with family and of continued distrust as she continues to believe she did not need to be hospitalized-states medication is not bothersome to her, but she does not feel it has affected her in anyway, except possibly improved sleep

## 2017-06-08 VITALS
DIASTOLIC BLOOD PRESSURE: 62 MMHG | HEIGHT: 67 IN | BODY MASS INDEX: 27.31 KG/M2 | TEMPERATURE: 98.8 F | RESPIRATION RATE: 12 BRPM | WEIGHT: 174 LBS | HEART RATE: 79 BPM | SYSTOLIC BLOOD PRESSURE: 111 MMHG

## 2017-06-08 PROCEDURE — 99238 HOSP IP/OBS DSCHRG MGMT 30/<: CPT | Mod: GC | Performed by: PSYCHIATRY & NEUROLOGY

## 2017-06-08 PROCEDURE — T1013 SIGN LANG/ORAL INTERPRETER: HCPCS | Mod: U3

## 2017-06-08 RX ORDER — HALOPERIDOL DECANOATE 50 MG/ML
75 INJECTION INTRAMUSCULAR
Qty: 1.5 ML | Refills: 0
Start: 2017-07-05 | End: 2017-07-06

## 2017-06-08 ASSESSMENT — ACTIVITIES OF DAILY LIVING (ADL)
ORAL_HYGIENE: INDEPENDENT
DRESS: INDEPENDENT
LAUNDRY: WITH SUPERVISION
GROOMING: INDEPENDENT

## 2017-06-08 NOTE — DISCHARGE SUMMARY
----------------------------------------------------------------------------------------------------------  [facility], [place]   Discharge Summary      Hilda Delgadillo MRN# [id number]   Age: 46 year old YOB: 1970     Date of Admission:  5/15/2017  Date of Discharge:  6/8/2017  Admitting Physician:  Eyal Hand MD  Discharge Physician:  Patricia Gordon MD         Event Leading to Hospitalization:     Hilda Delgadillo is a 46 year old female with a history of unspecified chronic psychosis who presented from the [facility] after she became agitated and paranoid, refusing to get on the plane and saying her family manipulated her.  She takes risperidone, but has been off of it for 4 days according to her son.       See Admission note by Eyal Hand MD on 5/15/2017 for additional details.          Diagnoses:     (F29) Psychosis, unspecified psychosis type  Evaluate for delusional disorder           Labs:     Results for orders placed or performed during the hospital encounter of 05/15/17   CBC with platelets   Result Value Ref Range    WBC 8.5 4.0 - 11.0 10e9/L    RBC Count 4.46 3.8 - 5.2 10e12/L    Hemoglobin 13.5 11.7 - 15.7 g/dL    Hematocrit 40.6 35.0 - 47.0 %    MCV 91 78 - 100 fl    MCH 30.3 26.5 - 33.0 pg    MCHC 33.3 31.5 - 36.5 g/dL    RDW 12.1 10.0 - 15.0 %    Platelet Count 196 150 - 450 10e9/L   Comprehensive metabolic panel   Result Value Ref Range    Sodium 144 133 - 144 mmol/L    Potassium 3.9 3.4 - 5.3 mmol/L    Chloride 111 (H) 94 - 109 mmol/L    Carbon Dioxide 25 20 - 32 mmol/L    Anion Gap 8 3 - 14 mmol/L    Glucose 100 (H) 70 - 99 mg/dL    Urea Nitrogen 10 7 - 30 mg/dL    Creatinine 0.65 0.52 - 1.04 mg/dL    GFR Estimate >90  Non  GFR Calc   >60 mL/min/1.7m2    GFR Estimate If Black >90   GFR Calc   >60 mL/min/1.7m2    Calcium 8.7 8.5 - 10.1 mg/dL    Bilirubin Total 0.3 0.2 - 1.3 mg/dL     Albumin 3.5 3.4 - 5.0 g/dL    Protein Total 7.7 6.8 - 8.8 g/dL    Alkaline Phosphatase 84 40 - 150 U/L    ALT 27 0 - 50 U/L    AST 14 0 - 45 U/L   Lipid panel   Result Value Ref Range    Cholesterol 207 (H) <200 mg/dL    Triglycerides 64 <150 mg/dL    HDL Cholesterol 71 >49 mg/dL    LDL Cholesterol Calculated 123 (H) <100 mg/dL    Non HDL Cholesterol 136 (H) <130 mg/dL   TSH with free T4 reflex and/or T3 as indicated   Result Value Ref Range    TSH 1.09 0.40 - 4.00 mU/L   UA reflex to Microscopic and Culture   Result Value Ref Range    Color Urine Yellow     Appearance Urine Cloudy     Glucose Urine Negative NEG mg/dL    Bilirubin Urine Negative NEG    Ketones Urine Negative NEG mg/dL    Specific Gravity Urine 1.013 1.003 - 1.035    Blood Urine Negative NEG    pH Urine 7.5 (H) 5.0 - 7.0 pH    Protein Albumin Urine Negative NEG mg/dL    Urobilinogen mg/dL Normal 0.0 - 2.0 mg/dL    Nitrite Urine Negative NEG    Leukocyte Esterase Urine Negative NEG    Source Midstream Urine     RBC Urine 0 0 - 2 /HPF    WBC Urine 0 0 - 2 /HPF    Mucous Urine Present (A) NEG /LPF    Amorphous Crystals Few (A) NEG /HPF   Risperdal quantitative   Result Value Ref Range    Risperidone Level <0.5  Unit: ng/ml       9-Hydroxyrisperidone Level 2.4     Combined Total Rispiridone and Metabolite 2.4 (L)    Follicle stimulating hormone   Result Value Ref Range    FSH 77.8 IU/L          Consults:     No consultations were requested during this admission         Hospital Course:     Hilda Delgadillo was admitted to station 22 on a 72 hour hold with attending psychiatrist Eyal Hand MD, and was placed on status 15 (15 minute checks) for patient safety. Patient  did not require seclusion or administration of emergency medications to manage behavior. She intermittently had more aggressive behavior with staff and other peers, and did not respond well to redirection or would ignore it. PTA medications were continued on admission, but patient refused  to take risperidone because she denied ever being on risperidone or having mental health issues.  Patient's sole symptom of psychosis was a fixed delusion of a personal relationship with former president Yvette; her presentation is most consistent with delusional disorder, and she may benefit more from CBT than antipsychotics in the future. Patient eported at times a special relationship with Alison Crawford, e.g., he had sent her a personal letter telling her she could stay in the [place], or sent her flowers; at other times, she denied saying or believing these ideas. She referred to [facility] as her primary support system. After [facility] order was received, patient was switched over from risperidone to haloperidol and eventually transitioned to Haldol decanoate, with loading dose given 6/5/17.  Patient experienced no side effects of Haldol. Of note, patient has a fine tremor of the b/l hands that was present before oral Haldal was started. After receiving Haldol dec injection, she experienced neck stiffness that spontaneously resolved and was assessed to not be an acute dystonic reaction.    Legal Course: Admitted on 72HH. During previous hospitalization at [facility], patient for commitment was not supported by her county of residence.  Due to second hospitalization within two months, not having a home to return to, no insight, and refusing to take medications, team applied for commitment with [facility] through [employer]; petition was supported.   Five days after commitment was place, staff learned that family had written a letter to patient pretending to be President Yvette; patient was able to produce said letter, which that told her President Yvette wanted to meet her in [place].  Patient received this letter after her hospitalization in [place]; however, [facility] [place] does describe delusions about [place].  This letter had been missing since beginning of present admission but was brought to the facility by her family  and first shown to staff by patient 2 days after she was committed.  Team spoke to risk management and spoke to the court with the new evidence.  The  said the  and  would review the new evidence, but as of time of discharge, there was enough evidence to uphold the commitment and vaughan.    The patient's symptoms of psychosis improved.    Hilda Delgadillo was discharged to home with family. At the time of discharge Hilda Delgadillo was determined to not be a danger to herself or others.    This document serves as a transfer of care to Hilda Delgadillo's outpatient providers.         Discharge Medications:     Psychiatry Medications Dose/Frequecy  - s/p Haldol dec loading dose of 100 mg (20x daily oral dose) 6/5/17   - recommend Haldol dec IM 75 mg ~7/5/17 (second loading dose)   - recommend Haldol dec IM 50 mg q30 days thereafter (maintenance dose)    Non Psychiatry Medications Dose/Frequency: none         Psychiatric Examination:     Appearance:  awake, alert and neatly groomed, dressed in hijab  Attitude:  cooperative  Eye Contact:  good  Mood:  good  Affect:  appropriate and in normal range  Speech:  clear, coherent and normal prosody  Psychomotor Behavior:  no evidence of tardive dyskinesia, dystonia, or tics and tremor observed   Thought Process:  linear and goal oriented  Associations:  no loose associations  Thought Content: + delusion of special relationship with Obama, less fixed and less emotionally salient. no evidence of suicidal ideation or homicidal ideation, no evidence of psychotic thought, no auditory hallucinations present and no visual hallucinations present  Insight:  partial  Judgment:  fair  Oriented to:  time, person, and place  Attention Span and Concentration:  intact  Recent and Remote Memory:  intact  Language: Able to name objects and Able to repeat phrases, speaks some English but needs Israeli   Fund of Knowledge: appropriate  Muscle Strength and  Tone: normal  Gait and Station: Normal         Discharge Plan:     [facility]:  9am   [address]  [address]nd and [place]rd [place] [address]  Phone: [phone]  FAX: [phone] Attention: [facility] Unit Coordinator will fax Facesheet, MAR, H&P and Discharge Summary to above contact in order to complete follow up appointment.     ------------------------------------------------------------------------------------------------------------------------------  This documentation accurately reflects the services I personally performed and treatment decisions made by me in consultation with the attending physician.     Albert Shanks PGY-1  2:37 PM 2017      Attestation:   The patient has been seen and evaluated by me,  Patricia Gordon. I have examined the patient today and reviewed the discharge plan with the resident and medical student. I agree with the final assessment and plan, as noted in the discharge summary. I have reviewed today's vital signs, medications, labs and imaging.  Total time discharge plannin minutes  Patricia Gordon MD

## 2017-06-08 NOTE — DISCHARGE INSTRUCTIONS
Behavioral Discharge Planning and Instructions      Summary:  You were admitted on 5/15/2017 for Delusional Thoughts.  You were treated by Dr. Eyal Hand MD and discharged on 6/8/17 from Station 22. While in the hospital you have been placed a civil commitment through [facility] and it will be in place until December 2017.    Main Diagnosis: Schizoaffective Disorder     Health Care Follow-up Appointments:     Because your MA is pending you will need to go to a walk-in appointment for medication at Thursday at 9am:  [facility]- Thursday June 15th 9am   [address]  [address]nd and [address]rd [place] [address]  Phone: [phone]  FAX: [phone] Attention: [facility] Unit Coordinator will fax Facesheet, MAR, H&P and Discharge Summary to above contact in order to complete follow up appointment.     Attend all scheduled appointments with your outpatient providers. Call at least 24 hours in advance if you need to reschedule an appointment to ensure continued access to your outpatient providers.   Major Treatments, Procedures and Findings:  You were provided with: a psychiatric assessment, medication evaluation and/or management and milieu management    Symptoms to Report: feeling more aggressive, increased confusion or mood getting worse    Early warning signs can include: increased depression or anxiety sleep disturbances increased unusual thinking, such as paranoia or hearing voices    Safety and Wellness:  Take all medicines as directed.  Make no changes unless your doctor suggests them.      Follow treatment recommendations.  Refrain from alcohol and non-prescribed drugs.  If there is a concern for safety, call 911.    Resources:   [facility]  [facility]  Walk-in Hours at:  [address], [address]  [place] [address]    Every Thursday from 1pm-4pm.    Please note that if you come for walk-in hours:  - You must arrive by 12:30 pm  "in order to be see. At 12:30, you will draw a number to determine the order in which you will be seen.  - We may need to schedule an additional time to meet with you in order to answer your question    [facility] [facility]  c/o LUCIANO Murray  [address], [address]  [place] [address]  [facility]  TELEPHONE(+1) [phone]    Crisis Intervention: 790.634.1881 or 413-959-6312 (TTY: 824.544.2263).  Call anytime for help.  National [place] on Mental Illness (www.[place].lynda.org): 753.651.2216 or 849-453-7008.  Suicide Awareness Voices of Education (SAVE) (www.save.org): 173-708-ATRH (2090)  National Suicide Prevention Line (www.mentalhealthmn.org): 091-907-QMER (9381)  Mental Health Consumer/Survivor Network of [place] (www.mhcsn.net): 890.968.5923 or 267-558-0143  Mental Health Association of [place] (www.mentalhealth.org): 835.123.6872 or 969-394-1876  Self- Management and Recovery Training., SMART-- Toll free: 854.928.2167  www.[employer].org  Crisis text line: Text \"START\" to 914-013. Free, confidential, 24/7.  Crisis Intervention: 115.257.6242 or 976-870-9087. Call anytime for help.     The treatment team has appreciated the opportunity to work with you.     If you have any questions or concerns our unit number is [phone]  You may be receiving a follow-up phone call within the next three days from a representative from behavioral health.            "

## 2017-06-08 NOTE — PROGRESS NOTES
Pt was visible in the milieu and attended group. Pt mentioned having stiffness in her neck and back, stated that it is feeling much better today although still present. Pt is looking forward to going home tomorrow, said she is happy she can go outside. Pt said she like it better here in [place] because her son in [place] is not good. Pt denies SI/SIB and hallucinations, calm and social with peers.      06/07/17 1915   Behavioral Health   Hallucinations denies / not responding to hallucinations   Thinking delusional;distractable   Orientation person: oriented;place: oriented;date: oriented   Memory baseline memory   Insight poor   Judgement impaired   Eye Contact at examiner   Affect full range affect   Mood mood is calm   Physical Appearance/Attire attire appropriate to age and situation   Hygiene well groomed   Suicidality other (see comments)  (denies )   Self Injury other (see comment)  (denies )   Activity other (see comment)  (active in milieu )   Speech clear;coherent   Medication Sensitivity no stated side effects;no observed side effects   Psychomotor / Gait balanced;steady   Psycho Education   Type of Intervention 1:1 intervention   Response participates, initiates socially appropriate   Hours 0.5   Treatment Detail check in    Group Therapy Session   Group Attendance attended group session   Group Type community   Activities of Daily Living   Hygiene/Grooming independent   Oral Hygiene independent   Dress independent   Laundry with supervision   Room Organization independent   Activity   Activity Level of Assistance independent

## 2017-06-09 NOTE — PLAN OF CARE
Problem: Psychotic Symptoms  Goal: Psychotic Symptoms  Signs and symptoms of listed problems will be absent or manageable.  -pt will verbalize coping skills to manage hallucinations  -pt will participate in groups demonstrating adequate attention span  -pt will identify when or if they are having any medication side effects  -pt will be able to have reality based conversations   Hilda discharged 1325 care of son to return to family's home in [place]-reviewed outpt follow up and plan for receiving Haldol Dec with both Astjonoil and son-both verbalize agreement-has printed instructions